# Patient Record
Sex: FEMALE | Race: WHITE | Employment: OTHER | URBAN - METROPOLITAN AREA
[De-identification: names, ages, dates, MRNs, and addresses within clinical notes are randomized per-mention and may not be internally consistent; named-entity substitution may affect disease eponyms.]

---

## 2020-09-11 ENCOUNTER — OFFICE VISIT (OUTPATIENT)
Dept: OBGYN CLINIC | Facility: CLINIC | Age: 25
End: 2020-09-11

## 2020-09-11 VITALS
SYSTOLIC BLOOD PRESSURE: 110 MMHG | DIASTOLIC BLOOD PRESSURE: 58 MMHG | WEIGHT: 138 LBS | HEART RATE: 99 BPM | BODY MASS INDEX: 25.4 KG/M2 | HEIGHT: 62 IN

## 2020-09-11 DIAGNOSIS — N76.0 VAGINITIS AND VULVOVAGINITIS: ICD-10-CM

## 2020-09-11 DIAGNOSIS — Z01.419 ENCOUNTER FOR WELL WOMAN EXAM WITH ROUTINE GYNECOLOGICAL EXAM: Primary | ICD-10-CM

## 2020-09-11 DIAGNOSIS — Z12.4 CERVICAL CANCER SCREENING: ICD-10-CM

## 2020-09-11 PROCEDURE — 88175 CYTOPATH C/V AUTO FLUID REDO: CPT | Performed by: OBSTETRICS & GYNECOLOGY

## 2020-09-11 PROCEDURE — 3074F SYST BP LT 130 MM HG: CPT | Performed by: OBSTETRICS & GYNECOLOGY

## 2020-09-11 PROCEDURE — 87591 N.GONORRHOEAE DNA AMP PROB: CPT | Performed by: OBSTETRICS & GYNECOLOGY

## 2020-09-11 PROCEDURE — 87660 TRICHOMONAS VAGIN DIR PROBE: CPT | Performed by: OBSTETRICS & GYNECOLOGY

## 2020-09-11 PROCEDURE — 87491 CHLMYD TRACH DNA AMP PROBE: CPT | Performed by: OBSTETRICS & GYNECOLOGY

## 2020-09-11 PROCEDURE — 3008F BODY MASS INDEX DOCD: CPT | Performed by: OBSTETRICS & GYNECOLOGY

## 2020-09-11 PROCEDURE — 87480 CANDIDA DNA DIR PROBE: CPT | Performed by: OBSTETRICS & GYNECOLOGY

## 2020-09-11 PROCEDURE — 87510 GARDNER VAG DNA DIR PROBE: CPT | Performed by: OBSTETRICS & GYNECOLOGY

## 2020-09-11 PROCEDURE — 99385 PREV VISIT NEW AGE 18-39: CPT | Performed by: OBSTETRICS & GYNECOLOGY

## 2020-09-11 PROCEDURE — 3078F DIAST BP <80 MM HG: CPT | Performed by: OBSTETRICS & GYNECOLOGY

## 2020-09-11 NOTE — PROGRESS NOTES
Andrew Dallas is a 30year old female  Patient's last menstrual period was 2020 (exact date). Patient presents with:  Gyn Problem: pt just wants order for blood work  Wellness Visit: ?   .Patient doesn't have complaints or concerns, would like meetings of clubs or organizations: Not on file        Relationship status: Not on file      Intimate partner violence:        Fear of current or ex partner: Not on file        Emotionally abused: Not on file        Physically abused: Not on file        Fo skin changes  Abdomen:  soft, nontender, nondistended, no masses  Skin/Hair: no unusual rashes or bruises  Extremities: no edema, no cyanosis  Psychiatric:  Oriented to time, place, person and situation.  Appropriate mood and affect    Pelvic Exam:  Externa

## 2020-09-14 LAB
C TRACH DNA SPEC QL NAA+PROBE: NEGATIVE
N GONORRHOEA DNA SPEC QL NAA+PROBE: NEGATIVE

## 2020-09-16 RX ORDER — METRONIDAZOLE 7.5 MG/G
1 GEL VAGINAL NIGHTLY
Qty: 70 G | Refills: 0 | Status: SHIPPED | OUTPATIENT
Start: 2020-09-16 | End: 2020-09-21

## 2020-09-17 NOTE — PROGRESS NOTES
Patient aware of positive culture results and recommendations for treatment. Script for Metrogel send to patient's pharmacy. Copy of results left for patient to  at the . Patient verbalizes understanding.

## 2020-11-10 ENCOUNTER — TELEPHONE (OUTPATIENT)
Dept: OBGYN CLINIC | Facility: CLINIC | Age: 25
End: 2020-11-10

## 2020-11-10 NOTE — TELEPHONE ENCOUNTER
Pt came to the office to  her envelope with her test results from September and also wanted the written prescription from dr TORRES from the day she was seen. Pt also had some questions about her bill.  We could not give her the prescription from [facility]

## 2022-02-17 ENCOUNTER — OFFICE VISIT (OUTPATIENT)
Dept: OBGYN CLINIC | Facility: CLINIC | Age: 27
End: 2022-02-17
Payer: COMMERCIAL

## 2022-02-17 ENCOUNTER — PATIENT MESSAGE (OUTPATIENT)
Dept: OBGYN CLINIC | Facility: CLINIC | Age: 27
End: 2022-02-17

## 2022-02-17 VITALS
SYSTOLIC BLOOD PRESSURE: 102 MMHG | HEIGHT: 62 IN | WEIGHT: 153 LBS | BODY MASS INDEX: 28.16 KG/M2 | HEART RATE: 105 BPM | DIASTOLIC BLOOD PRESSURE: 52 MMHG

## 2022-02-17 DIAGNOSIS — Z12.4 CERVICAL CANCER SCREENING: ICD-10-CM

## 2022-02-17 DIAGNOSIS — Z11.3 SCREEN FOR STD (SEXUALLY TRANSMITTED DISEASE): ICD-10-CM

## 2022-02-17 DIAGNOSIS — Z01.419 ENCOUNTER FOR WELL WOMAN EXAM WITH ROUTINE GYNECOLOGICAL EXAM: Primary | ICD-10-CM

## 2022-02-17 PROCEDURE — 87491 CHLMYD TRACH DNA AMP PROBE: CPT | Performed by: OBSTETRICS & GYNECOLOGY

## 2022-02-17 PROCEDURE — 88175 CYTOPATH C/V AUTO FLUID REDO: CPT | Performed by: OBSTETRICS & GYNECOLOGY

## 2022-02-17 PROCEDURE — 87591 N.GONORRHOEAE DNA AMP PROB: CPT | Performed by: OBSTETRICS & GYNECOLOGY

## 2022-02-17 PROCEDURE — 3078F DIAST BP <80 MM HG: CPT | Performed by: OBSTETRICS & GYNECOLOGY

## 2022-02-17 PROCEDURE — 3074F SYST BP LT 130 MM HG: CPT | Performed by: OBSTETRICS & GYNECOLOGY

## 2022-02-17 PROCEDURE — 99395 PREV VISIT EST AGE 18-39: CPT | Performed by: OBSTETRICS & GYNECOLOGY

## 2022-02-17 PROCEDURE — 3008F BODY MASS INDEX DOCD: CPT | Performed by: OBSTETRICS & GYNECOLOGY

## 2022-02-17 NOTE — TELEPHONE ENCOUNTER
Insurance card received through [address] [place] [address] [address] for check in purposes.

## 2022-02-18 LAB
C TRACH DNA SPEC QL NAA+PROBE: NEGATIVE
N GONORRHOEA DNA SPEC QL NAA+PROBE: NEGATIVE

## 2023-07-27 ENCOUNTER — TELEPHONE (OUTPATIENT)
Facility: CLINIC | Age: 28
End: 2023-07-27

## 2023-07-27 NOTE — TELEPHONE ENCOUNTER
Pt is returning our phone call - does not have ins on hand, will call back within 48 hrs with the info, understands we will Cx appt if no ins

## 2023-09-01 DIAGNOSIS — O36.80X0 PREGNANCY WITH UNCERTAIN FETAL VIABILITY, SINGLE OR UNSPECIFIED FETUS: Primary | ICD-10-CM

## 2023-09-07 ENCOUNTER — ULTRASOUND ENCOUNTER (OUTPATIENT)
Facility: CLINIC | Age: 28
End: 2023-09-07
Payer: COMMERCIAL

## 2023-09-11 ENCOUNTER — TELEPHONE (OUTPATIENT)
Dept: OBGYN CLINIC | Facility: CLINIC | Age: 28
End: 2023-09-11

## 2023-09-11 ENCOUNTER — INITIAL PRENATAL (OUTPATIENT)
Dept: OBGYN CLINIC | Facility: CLINIC | Age: 28
End: 2023-09-11
Payer: COMMERCIAL

## 2023-09-11 ENCOUNTER — LAB ENCOUNTER (OUTPATIENT)
Dept: LAB | Age: 28
End: 2023-09-11
Payer: COMMERCIAL

## 2023-09-11 VITALS
HEIGHT: 62 IN | WEIGHT: 153 LBS | DIASTOLIC BLOOD PRESSURE: 72 MMHG | BODY MASS INDEX: 28.16 KG/M2 | SYSTOLIC BLOOD PRESSURE: 106 MMHG | HEART RATE: 92 BPM

## 2023-09-11 DIAGNOSIS — Z34.01 ENCOUNTER FOR SUPERVISION OF NORMAL FIRST PREGNANCY IN FIRST TRIMESTER: Primary | ICD-10-CM

## 2023-09-11 DIAGNOSIS — Z34.91 INITIAL OBSTETRIC VISIT IN FIRST TRIMESTER: ICD-10-CM

## 2023-09-11 DIAGNOSIS — Z13.79 GENETIC SCREENING: ICD-10-CM

## 2023-09-11 DIAGNOSIS — O30.041 DICHORIONIC DIAMNIOTIC TWIN PREGNANCY IN FIRST TRIMESTER: ICD-10-CM

## 2023-09-11 LAB
ANTIBODY SCREEN: NEGATIVE
APPEARANCE: CLEAR
BASOPHILS # BLD AUTO: 0.02 X10(3) UL (ref 0–0.2)
BASOPHILS NFR BLD AUTO: 0.2 %
BILIRUB UR QL STRIP.AUTO: NEGATIVE
BILIRUBIN: NEGATIVE
CLARITY UR REFRACT.AUTO: CLEAR
COLOR UR AUTO: COLORLESS
EOSINOPHIL # BLD AUTO: 0.18 X10(3) UL (ref 0–0.7)
EOSINOPHIL NFR BLD AUTO: 2 %
ERYTHROCYTE [DISTWIDTH] IN BLOOD BY AUTOMATED COUNT: 12.7 %
GLUCOSE (URINE DIPSTICK): NEGATIVE MG/DL
GLUCOSE UR STRIP.AUTO-MCNC: NORMAL MG/DL
HBV SURFACE AG SER-ACNC: <0.1 [IU]/L
HBV SURFACE AG SERPL QL IA: NONREACTIVE
HCT VFR BLD AUTO: 37.5 %
HCV AB SERPL QL IA: NONREACTIVE
HGB BLD-MCNC: 11.9 G/DL
IMM GRANULOCYTES # BLD AUTO: 0.05 X10(3) UL (ref 0–1)
IMM GRANULOCYTES NFR BLD: 0.6 %
KETONES (URINE DIPSTICK): NEGATIVE MG/DL
KETONES UR STRIP.AUTO-MCNC: NEGATIVE MG/DL
LEUKOCYTE ESTERASE UR QL STRIP.AUTO: NEGATIVE
LEUKOCYTES: NEGATIVE
LYMPHOCYTES # BLD AUTO: 1.08 X10(3) UL (ref 1–4)
LYMPHOCYTES NFR BLD AUTO: 12.1 %
MCH RBC QN AUTO: 26.9 PG (ref 26–34)
MCHC RBC AUTO-ENTMCNC: 31.7 G/DL (ref 31–37)
MCV RBC AUTO: 84.7 FL
MONOCYTES # BLD AUTO: 0.47 X10(3) UL (ref 0.1–1)
MONOCYTES NFR BLD AUTO: 5.3 %
MULTISTIX LOT#: NORMAL NUMERIC
NEUTROPHILS # BLD AUTO: 7.1 X10 (3) UL (ref 1.5–7.7)
NEUTROPHILS # BLD AUTO: 7.1 X10(3) UL (ref 1.5–7.7)
NEUTROPHILS NFR BLD AUTO: 79.8 %
NITRITE UR QL STRIP.AUTO: NEGATIVE
NITRITE, URINE: NEGATIVE
OCCULT BLOOD: NEGATIVE
PH UR STRIP.AUTO: 6 [PH] (ref 5–8)
PH, URINE: 6 (ref 4.5–8)
PLATELET # BLD AUTO: 278 10(3)UL (ref 150–450)
PROT UR STRIP.AUTO-MCNC: NEGATIVE MG/DL
PROTEIN (URINE DIPSTICK): NEGATIVE MG/DL
RBC # BLD AUTO: 4.43 X10(6)UL
RBC UR QL AUTO: NEGATIVE
RH BLOOD TYPE: NEGATIVE
RUBV IGG SER QL: POSITIVE
RUBV IGG SER-ACNC: 356.8 IU/ML (ref 10–?)
SP GR UR STRIP.AUTO: <1.005 (ref 1–1.03)
SPECIFIC GRAVITY: 1 (ref 1–1.03)
T PALLIDUM AB SER QL IA: REACTIVE
URINE-COLOR: YELLOW
UROBILINOGEN UR STRIP.AUTO-MCNC: NORMAL MG/DL
UROBILINOGEN,SEMI-QN: 0.2 MG/DL (ref 0–1.9)
WBC # BLD AUTO: 8.9 X10(3) UL (ref 4–11)

## 2023-09-11 PROCEDURE — 86592 SYPHILIS TEST NON-TREP QUAL: CPT

## 2023-09-11 PROCEDURE — 87491 CHLMYD TRACH DNA AMP PROBE: CPT

## 2023-09-11 PROCEDURE — 86901 BLOOD TYPING SEROLOGIC RH(D): CPT

## 2023-09-11 PROCEDURE — 87086 URINE CULTURE/COLONY COUNT: CPT

## 2023-09-11 PROCEDURE — 85025 COMPLETE CBC W/AUTO DIFF WBC: CPT

## 2023-09-11 PROCEDURE — 86900 BLOOD TYPING SEROLOGIC ABO: CPT

## 2023-09-11 PROCEDURE — 86780 TREPONEMA PALLIDUM: CPT

## 2023-09-11 PROCEDURE — 87389 HIV-1 AG W/HIV-1&-2 AB AG IA: CPT

## 2023-09-11 PROCEDURE — 87591 N.GONORRHOEAE DNA AMP PROB: CPT

## 2023-09-11 PROCEDURE — 87340 HEPATITIS B SURFACE AG IA: CPT

## 2023-09-11 PROCEDURE — 81003 URINALYSIS AUTO W/O SCOPE: CPT

## 2023-09-11 PROCEDURE — 86803 HEPATITIS C AB TEST: CPT

## 2023-09-11 PROCEDURE — 86850 RBC ANTIBODY SCREEN: CPT

## 2023-09-11 PROCEDURE — 86762 RUBELLA ANTIBODY: CPT

## 2023-09-11 RX ORDER — CHOLECALCIFEROL (VITAMIN D3) 25 MCG
1 TABLET,CHEWABLE ORAL DAILY
COMMUNITY

## 2023-09-11 NOTE — PROGRESS NOTES
Initial OB 11w4d    She is doing well, no complaints. She is considering changing OB practices - \"not happy having a group of doctors. \"     Had OB US at the clinic , [facility] by LMP c/w 11w5d US - dichorinoic/diaminotic IUP .    Twin A measuring 11w5d, 159 bpm   Twin B measuring 11w3d, 160 bpm     OBHx:   PMHx: denies hepatitis, blood transfusion, HSV, VTE, Pap 2022 - NILM   PSHx: denies       Genetic Screening discussed  -NIPT & Carrier - drawn today       Di/Di twin pregnancy  -early consult MFM - ordered today  -L2US, growths, Nsts      Overweight  15-25 lb wt gain

## 2023-09-11 NOTE — TELEPHONE ENCOUNTER
Sandi Feldman patient's blood for NIPS and carrier screening. Patient confirmed the name and  on all three tubes were correct. Left box on counter.

## 2023-09-12 PROBLEM — O30.041 DICHORIONIC DIAMNIOTIC TWIN PREGNANCY IN FIRST TRIMESTER (HCC): Status: ACTIVE | Noted: 2023-09-12

## 2023-09-12 PROBLEM — O30.041 DICHORIONIC DIAMNIOTIC TWIN PREGNANCY IN FIRST TRIMESTER: Status: ACTIVE | Noted: 2023-09-12

## 2023-09-12 LAB
C TRACH DNA SPEC QL NAA+PROBE: NEGATIVE
N GONORRHOEA DNA SPEC QL NAA+PROBE: NEGATIVE
RPR SER QL: NONREACTIVE

## 2023-09-13 LAB — T PALLIDUM ABS: REACTIVE

## 2023-09-16 PROBLEM — Z67.91 RH NEGATIVE STATE IN ANTEPARTUM PERIOD (HCC): Status: ACTIVE | Noted: 2023-09-16

## 2023-09-16 PROBLEM — A53.9 SERUM POSITIVE FOR TREPONEMA PALLIDUM BY PCR: Status: ACTIVE | Noted: 2023-09-16

## 2023-09-16 PROBLEM — O26.899 RH NEGATIVE STATE IN ANTEPARTUM PERIOD (HCC): Status: ACTIVE | Noted: 2023-09-16

## 2023-09-16 PROBLEM — O26.899 RH NEGATIVE STATE IN ANTEPARTUM PERIOD: Status: ACTIVE | Noted: 2023-09-16

## 2023-09-16 PROBLEM — Z67.91 RH NEGATIVE STATE IN ANTEPARTUM PERIOD: Status: ACTIVE | Noted: 2023-09-16

## 2023-09-17 LAB
AMB EXT MYRIAD TRISOMY 13: NEGATIVE
AMB EXT MYRIAD TRISOMY 18: NEGATIVE
AMB EXT MYRIAD TRISOMY 21: NEGATIVE

## 2023-09-18 NOTE — PROGRESS NOTES
Discussed providers message: LAB results and recommendation Rhogam at 28wks , patient verbalized understanding.

## 2023-09-20 ENCOUNTER — TELEPHONE (OUTPATIENT)
Facility: CLINIC | Age: 28
End: 2023-09-20

## 2023-09-20 NOTE — TELEPHONE ENCOUNTER
NIPS negative, pt declined release of her results in [facility] request for results to be placed in an envelope, pt can  at . Patient verbalized understanding, agreed to and intend to comply with plan of care.

## 2023-09-25 ENCOUNTER — TELEPHONE (OUTPATIENT)
Facility: CLINIC | Age: 28
End: 2023-09-25

## 2023-09-25 NOTE — TELEPHONE ENCOUNTER
Discussed Carrier result: [facility] partner screening, results released, [facility] genetic counseling-contact given. If pt would like to proceed with partner testing, to call our office. We'll need partner's name . Per pt her address is not correct in her chart, transferred to [facility] to update info. Patient verbalized understanding, agreed to and intend to comply with plan of care.

## 2023-09-25 NOTE — TELEPHONE ENCOUNTER
Called pt VM not set up, does not have mychart msg sanjana.      Need to let pt know:  Carrier result: Marylin Henderson  [phone]

## 2023-10-11 ENCOUNTER — ROUTINE PRENATAL (OUTPATIENT)
Facility: CLINIC | Age: 28
End: 2023-10-11
Payer: COMMERCIAL

## 2023-10-11 VITALS
BODY MASS INDEX: 28.16 KG/M2 | HEART RATE: 114 BPM | SYSTOLIC BLOOD PRESSURE: 104 MMHG | WEIGHT: 153 LBS | HEIGHT: 62 IN | DIASTOLIC BLOOD PRESSURE: 61 MMHG

## 2023-10-11 DIAGNOSIS — Z34.02 ENCOUNTER FOR SUPERVISION OF NORMAL FIRST PREGNANCY IN SECOND TRIMESTER: Primary | ICD-10-CM

## 2023-10-11 DIAGNOSIS — O30.041 DICHORIONIC DIAMNIOTIC TWIN PREGNANCY IN FIRST TRIMESTER: ICD-10-CM

## 2023-10-11 DIAGNOSIS — Z3A.15 15 WEEKS GESTATION OF PREGNANCY: ICD-10-CM

## 2023-10-11 PROCEDURE — 3008F BODY MASS INDEX DOCD: CPT | Performed by: OBSTETRICS & GYNECOLOGY

## 2023-10-11 PROCEDURE — 3078F DIAST BP <80 MM HG: CPT | Performed by: OBSTETRICS & GYNECOLOGY

## 2023-10-11 PROCEDURE — 3074F SYST BP LT 130 MM HG: CPT | Performed by: OBSTETRICS & GYNECOLOGY

## 2023-10-14 NOTE — PROGRESS NOTES
Patient has no complaints, multiple questions answered   - EDC by LMP c/w 11w5d US    Genetic Screening discussed  -NIPT neg  - Azalea Dumas 39. - partner testing consider       1. Di/Di twin pregnancy  -L2U MFM - ordered today  -US growths, NSTs per MFM      2. Overweight  15-25 lb wt gain

## 2023-11-09 ENCOUNTER — TELEPHONE (OUTPATIENT)
Dept: PERINATAL CARE | Facility: HOSPITAL | Age: 28
End: 2023-11-09

## 2023-11-15 ENCOUNTER — ULTRASOUND ENCOUNTER (OUTPATIENT)
Dept: PERINATAL CARE | Facility: HOSPITAL | Age: 28
End: 2023-11-15
Attending: OBSTETRICS & GYNECOLOGY
Payer: COMMERCIAL

## 2023-11-15 VITALS
HEIGHT: 62 IN | BODY MASS INDEX: 29.81 KG/M2 | WEIGHT: 162 LBS | DIASTOLIC BLOOD PRESSURE: 63 MMHG | HEART RATE: 116 BPM | SYSTOLIC BLOOD PRESSURE: 109 MMHG

## 2023-11-15 DIAGNOSIS — O30.042 DICHORIONIC DIAMNIOTIC TWIN PREGNANCY IN SECOND TRIMESTER: Primary | ICD-10-CM

## 2023-11-15 DIAGNOSIS — O30.041 DICHORIONIC DIAMNIOTIC TWIN PREGNANCY IN FIRST TRIMESTER: ICD-10-CM

## 2023-11-15 PROCEDURE — 76811 OB US DETAILED SNGL FETUS: CPT | Performed by: OBSTETRICS & GYNECOLOGY

## 2023-11-15 PROCEDURE — 76811 OB US DETAILED SNGL FETUS: CPT

## 2023-11-15 PROCEDURE — 76812 OB US DETAILED ADDL FETUS: CPT

## 2023-11-17 ENCOUNTER — ROUTINE PRENATAL (OUTPATIENT)
Dept: OBGYN CLINIC | Facility: CLINIC | Age: 28
End: 2023-11-17
Payer: COMMERCIAL

## 2023-11-17 VITALS
HEIGHT: 62 IN | BODY MASS INDEX: 29.62 KG/M2 | DIASTOLIC BLOOD PRESSURE: 78 MMHG | HEART RATE: 120 BPM | SYSTOLIC BLOOD PRESSURE: 116 MMHG | WEIGHT: 160.94 LBS

## 2023-11-17 DIAGNOSIS — O30.042 DICHORIONIC DIAMNIOTIC TWIN PREGNANCY IN SECOND TRIMESTER: Primary | ICD-10-CM

## 2023-11-17 PROCEDURE — 3074F SYST BP LT 130 MM HG: CPT

## 2023-11-17 PROCEDURE — 3078F DIAST BP <80 MM HG: CPT

## 2023-11-17 PROCEDURE — 3008F BODY MASS INDEX DOCD: CPT

## 2023-11-17 NOTE — PROGRESS NOTES
SLIME 21w1d    She is doing well, no complaints. Doppler Twin A 156 ( maternal left)                Twin B 144 ( maternal right)    EDC by LMP c/w 11w5d US     Genetic Screening discussed  -NIPT neg - does not want to know the gender   - Azalea Dumas 39. - partner testing consider         1. Di/Di twin pregnancy  -L2U MFM - normal anatomy for twin A & B, 8% discordance   - Monthly US growths & BPP at 32 weeks   -Weekly NSTs at 32 weeks  -Delivery at 38-38w6d  -consider low dose ASA  mg daily til 37 weeks         2. Overweight  15-25 lb wt gain

## 2023-12-11 DIAGNOSIS — A53.9 SERUM POSITIVE FOR TREPONEMA PALLIDUM BY PCR: Primary | ICD-10-CM

## 2023-12-13 ENCOUNTER — TELEPHONE (OUTPATIENT)
Dept: OBGYN CLINIC | Facility: CLINIC | Age: 28
End: 2023-12-13

## 2023-12-13 NOTE — TELEPHONE ENCOUNTER
Received a call from the health department regarding test results. On 9/11/23 T pallidum was reactive but RPR was non reactive. Health dept requires a third test to be done to confirm pt is negative for syphilis. Pt aware Juanjo Lyons placed the order for a follow up blood test. Pt will discuss with Dr. Herve Darby at appointment tomorrow and have blood test done. Verbalized understanding.

## 2023-12-14 ENCOUNTER — LAB ENCOUNTER (OUTPATIENT)
Dept: LAB | Age: 28
End: 2023-12-14
Attending: OBSTETRICS & GYNECOLOGY
Payer: COMMERCIAL

## 2023-12-14 ENCOUNTER — ROUTINE PRENATAL (OUTPATIENT)
Facility: CLINIC | Age: 28
End: 2023-12-14
Payer: COMMERCIAL

## 2023-12-14 VITALS
DIASTOLIC BLOOD PRESSURE: 60 MMHG | WEIGHT: 165.5 LBS | HEART RATE: 106 BPM | SYSTOLIC BLOOD PRESSURE: 116 MMHG | HEIGHT: 62 IN | BODY MASS INDEX: 30.46 KG/M2

## 2023-12-14 DIAGNOSIS — N89.8 VAGINA ITCHING: ICD-10-CM

## 2023-12-14 DIAGNOSIS — R35.0 URINARY FREQUENCY: ICD-10-CM

## 2023-12-14 DIAGNOSIS — Z34.02 ENCOUNTER FOR SUPERVISION OF NORMAL FIRST PREGNANCY IN SECOND TRIMESTER: Primary | ICD-10-CM

## 2023-12-14 DIAGNOSIS — Z3A.25 25 WEEKS GESTATION OF PREGNANCY: ICD-10-CM

## 2023-12-14 DIAGNOSIS — Z34.02 ENCOUNTER FOR SUPERVISION OF NORMAL FIRST PREGNANCY IN SECOND TRIMESTER: ICD-10-CM

## 2023-12-14 DIAGNOSIS — A53.9 SERUM POSITIVE FOR TREPONEMA PALLIDUM BY PCR: ICD-10-CM

## 2023-12-14 LAB
BASOPHILS # BLD AUTO: 0.02 X10(3) UL (ref 0–0.2)
BASOPHILS NFR BLD AUTO: 0.2 %
EOSINOPHIL # BLD AUTO: 0.15 X10(3) UL (ref 0–0.7)
EOSINOPHIL NFR BLD AUTO: 1.7 %
ERYTHROCYTE [DISTWIDTH] IN BLOOD BY AUTOMATED COUNT: 14.2 %
GLUCOSE 1H P GLC SERPL-MCNC: 193 MG/DL
HCT VFR BLD AUTO: 30 %
HGB BLD-MCNC: 10 G/DL
IMM GRANULOCYTES # BLD AUTO: 0.11 X10(3) UL (ref 0–1)
IMM GRANULOCYTES NFR BLD: 1.2 %
LYMPHOCYTES # BLD AUTO: 1.2 X10(3) UL (ref 1–4)
LYMPHOCYTES NFR BLD AUTO: 13.5 %
MCH RBC QN AUTO: 27.5 PG (ref 26–34)
MCHC RBC AUTO-ENTMCNC: 33.3 G/DL (ref 31–37)
MCV RBC AUTO: 82.6 FL
MONOCYTES # BLD AUTO: 0.43 X10(3) UL (ref 0.1–1)
MONOCYTES NFR BLD AUTO: 4.8 %
NEUTROPHILS # BLD AUTO: 6.96 X10 (3) UL (ref 1.5–7.7)
NEUTROPHILS # BLD AUTO: 6.96 X10(3) UL (ref 1.5–7.7)
NEUTROPHILS NFR BLD AUTO: 78.6 %
PLATELET # BLD AUTO: 236 10(3)UL (ref 150–450)
RBC # BLD AUTO: 3.63 X10(6)UL
WBC # BLD AUTO: 8.9 X10(3) UL (ref 4–11)

## 2023-12-14 PROCEDURE — 82950 GLUCOSE TEST: CPT

## 2023-12-14 PROCEDURE — 86780 TREPONEMA PALLIDUM: CPT

## 2023-12-14 PROCEDURE — 81514 NFCT DS BV&VAGINITIS DNA ALG: CPT | Performed by: OBSTETRICS & GYNECOLOGY

## 2023-12-14 PROCEDURE — 36415 COLL VENOUS BLD VENIPUNCTURE: CPT

## 2023-12-14 PROCEDURE — 87086 URINE CULTURE/COLONY COUNT: CPT | Performed by: OBSTETRICS & GYNECOLOGY

## 2023-12-14 PROCEDURE — 85025 COMPLETE CBC W/AUTO DIFF WBC: CPT

## 2023-12-15 DIAGNOSIS — Z34.92 PRENATAL CARE IN SECOND TRIMESTER: ICD-10-CM

## 2023-12-15 DIAGNOSIS — O99.019 ANTEPARTUM ANEMIA: Primary | ICD-10-CM

## 2023-12-15 DIAGNOSIS — R73.02 IMPAIRED GLUCOSE TOLERANCE: Primary | ICD-10-CM

## 2023-12-15 LAB
BV BACTERIA DNA VAG QL NAA+PROBE: NEGATIVE
C GLABRATA DNA VAG QL NAA+PROBE: NEGATIVE
C KRUSEI DNA VAG QL NAA+PROBE: NEGATIVE
CANDIDA DNA VAG QL NAA+PROBE: POSITIVE
FTA AB T PALLIDUM: REACTIVE
T VAGINALIS DNA VAG QL NAA+PROBE: NEGATIVE

## 2023-12-18 ENCOUNTER — TELEPHONE (OUTPATIENT)
Facility: CLINIC | Age: 28
End: 2023-12-18

## 2023-12-18 NOTE — TELEPHONE ENCOUNTER
Pt having trouble receiving her results from [facility], contact information provided. Patient verbalized understanding.

## 2023-12-18 NOTE — TELEPHONE ENCOUNTER
Please print invitae results. Pt did not understand the gender on the previous results she picked up. Let  know when ready.

## 2023-12-18 NOTE — TELEPHONE ENCOUNTER
Pt upset on phone, she thinks she received wrong envelope instead of gender. Discussed what kind of information she received, pt dismissive saying it does not matter, it was all the results! And insist that her friend will call our office for us to provide gender info. Advised that we can release gender result in her email and she can fwd email to her friend,ediht. Understanding.

## 2023-12-20 NOTE — PROGRESS NOTES
Pt aware of results & recommendations for Terazole & IV iron. Terazole order pended. Will check if PA is needed and give pt scheduling information at her appointment on Friday. Verbalized understanding.

## 2023-12-21 ENCOUNTER — TELEPHONE (OUTPATIENT)
Dept: OBGYN CLINIC | Facility: CLINIC | Age: 28
End: 2023-12-21

## 2023-12-21 PROBLEM — O99.012 ANEMIA COMPLICATING PREGNANCY IN SECOND TRIMESTER: Status: ACTIVE | Noted: 2023-12-21

## 2023-12-21 PROBLEM — O99.012 ANEMIA COMPLICATING PREGNANCY IN SECOND TRIMESTER (HCC): Status: ACTIVE | Noted: 2023-12-21

## 2023-12-21 NOTE — TELEPHONE ENCOUNTER
[employer] [place] called to check if PA is required for IV iorn infusions. No prior authorization requires for CPT codes [phone]. Ref #[id number]. Will fax order to the [facility] ([facility]) once signed. Pt requests to get scheduling information at her [address] [id number] appointment tomorrow.

## 2023-12-22 ENCOUNTER — TELEPHONE (OUTPATIENT)
Dept: HEMATOLOGY/ONCOLOGY | Facility: HOSPITAL | Age: 28
End: 2023-12-22

## 2023-12-22 ENCOUNTER — ROUTINE PRENATAL (OUTPATIENT)
Facility: CLINIC | Age: 28
End: 2023-12-22
Payer: COMMERCIAL

## 2023-12-22 ENCOUNTER — LAB ENCOUNTER (OUTPATIENT)
Dept: LAB | Age: 28
End: 2023-12-22
Attending: OBSTETRICS & GYNECOLOGY
Payer: COMMERCIAL

## 2023-12-22 VITALS
WEIGHT: 164 LBS | BODY MASS INDEX: 30.18 KG/M2 | SYSTOLIC BLOOD PRESSURE: 100 MMHG | DIASTOLIC BLOOD PRESSURE: 60 MMHG | HEIGHT: 62 IN | HEART RATE: 103 BPM

## 2023-12-22 DIAGNOSIS — Z34.92 PRENATAL CARE IN SECOND TRIMESTER: ICD-10-CM

## 2023-12-22 DIAGNOSIS — O99.019 ANTEPARTUM ANEMIA: ICD-10-CM

## 2023-12-22 DIAGNOSIS — R73.02 IMPAIRED GLUCOSE TOLERANCE: ICD-10-CM

## 2023-12-22 DIAGNOSIS — O24.410 DIET CONTROLLED GESTATIONAL DIABETES MELLITUS (GDM) IN SECOND TRIMESTER: Primary | ICD-10-CM

## 2023-12-22 LAB
DEPRECATED HBV CORE AB SER IA-ACNC: 19 NG/ML
GLUCOSE 1H P GLC SERPL-MCNC: 204 MG/DL
GLUCOSE 2H P GLC SERPL-MCNC: 205 MG/DL
GLUCOSE 3H P GLC SERPL-MCNC: 190 MG/DL (ref 70–140)
GLUCOSE P FAST SERPL-MCNC: 98 MG/DL

## 2023-12-22 PROCEDURE — 82952 GTT-ADDED SAMPLES: CPT

## 2023-12-22 PROCEDURE — 36415 COLL VENOUS BLD VENIPUNCTURE: CPT

## 2023-12-22 PROCEDURE — 82951 GLUCOSE TOLERANCE TEST (GTT): CPT

## 2023-12-22 PROCEDURE — 82728 ASSAY OF FERRITIN: CPT

## 2023-12-22 NOTE — PROGRESS NOTES
Patient is very upset we tested her for syphilis again and keep calling her to follow up - she admits to h/o syphilis and treatment - she does not want her spouse to know about and for us to bring it up again   - Patient is doing her 3 GTT today and already has 2 elevated values, discussed GDM, diabetes education appointment scheduled    -NIPT neg - one twin is a [address] - partner testing consider         1. Di/Di twin pregnancy  -L2U MFM - normal anatomy for twin A & B, 8% discordance , has follow up scheduled  - Monthly US growths & BPP at 32 weeks   -Weekly NSTs at 32 weeks  -Delivery at 38-38w6d  -consider low dose ASA  mg daily til 37 weeks         2. Overweight  15-25 lb wt gain     3.  GDM  - patient has machine to check her sugars, got it from [employer] awhile ago   - will schedule her appointment with diabetes educator and bring log next visit

## 2023-12-22 NOTE — PROGRESS NOTES
Patient has no complaints  - CBC, 1GTT ordered  - patient aware Tpal positive, RPR neg, repeat ordered      Genetic Screening discussed  -NIPT neg - one twin is a [address] - partner testing consider         1. Di/Di twin pregnancy  -L2U MFM - normal anatomy for twin A & B, 8% discordance   - Monthly US growths & BPP at 32 weeks   -Weekly NSTs at 32 weeks  -Delivery at 38-38w6d  -consider low dose ASA  mg daily til 37 weeks         2. Overweight  15-25 lb wt gain

## 2023-12-26 ENCOUNTER — TELEPHONE (OUTPATIENT)
Facility: CLINIC | Age: 28
End: 2023-12-26

## 2023-12-26 ENCOUNTER — OFFICE VISIT (OUTPATIENT)
Dept: PERINATAL CARE | Facility: HOSPITAL | Age: 28
End: 2023-12-26
Attending: OBSTETRICS & GYNECOLOGY
Payer: COMMERCIAL

## 2023-12-26 VITALS
HEIGHT: 62 IN | WEIGHT: 165 LBS | HEART RATE: 111 BPM | SYSTOLIC BLOOD PRESSURE: 108 MMHG | DIASTOLIC BLOOD PRESSURE: 66 MMHG | BODY MASS INDEX: 30.36 KG/M2

## 2023-12-26 DIAGNOSIS — O30.041 DICHORIONIC DIAMNIOTIC TWIN PREGNANCY IN FIRST TRIMESTER: ICD-10-CM

## 2023-12-26 DIAGNOSIS — O24.410 DIET CONTROLLED GESTATIONAL DIABETES MELLITUS (GDM) IN THIRD TRIMESTER: ICD-10-CM

## 2023-12-26 DIAGNOSIS — O30.041 DICHORIONIC DIAMNIOTIC TWIN PREGNANCY IN FIRST TRIMESTER: Primary | ICD-10-CM

## 2023-12-26 PROCEDURE — 76816 OB US FOLLOW-UP PER FETUS: CPT | Performed by: OBSTETRICS & GYNECOLOGY

## 2023-12-26 NOTE — TELEPHONE ENCOUNTER
Pt came into the office to state we sent her prescription to the wrong [facility]. Updated [facility] on file to the correct one (close to her house)   Advised pt to call the [facility] and have the script transferred over. Pt states to still send a message to the nurses please.

## 2023-12-26 NOTE — PROGRESS NOTES
Pt here for Growth Ultrasound  +fm noted both babies per patient  Pt denies complaints.    Pt noted GDM--Pt to meet with Diabetic Educator--OB following

## 2023-12-29 NOTE — TELEPHONE ENCOUNTER
Pt did not transfer her Rx to the pharmacy near home. Please send new Rx to the updated [facility] on file.

## 2024-01-03 ENCOUNTER — ROUTINE PRENATAL (OUTPATIENT)
Facility: CLINIC | Age: 29
End: 2024-01-03
Payer: MEDICAID

## 2024-01-03 ENCOUNTER — OFFICE VISIT (OUTPATIENT)
Dept: HEMATOLOGY/ONCOLOGY | Facility: HOSPITAL | Age: 29
End: 2024-01-03
Attending: OBSTETRICS & GYNECOLOGY
Payer: COMMERCIAL

## 2024-01-03 ENCOUNTER — LAB ENCOUNTER (OUTPATIENT)
Dept: LAB | Age: 29
End: 2024-01-03
Attending: OBSTETRICS & GYNECOLOGY
Payer: COMMERCIAL

## 2024-01-03 VITALS
SYSTOLIC BLOOD PRESSURE: 102 MMHG | HEART RATE: 108 BPM | HEIGHT: 62 IN | WEIGHT: 170 LBS | BODY MASS INDEX: 31.28 KG/M2 | DIASTOLIC BLOOD PRESSURE: 62 MMHG

## 2024-01-03 VITALS
DIASTOLIC BLOOD PRESSURE: 76 MMHG | HEART RATE: 94 BPM | TEMPERATURE: 98 F | RESPIRATION RATE: 16 BRPM | WEIGHT: 170.19 LBS | BODY MASS INDEX: 30.92 KG/M2 | HEIGHT: 62.01 IN | SYSTOLIC BLOOD PRESSURE: 118 MMHG | OXYGEN SATURATION: 100 %

## 2024-01-03 DIAGNOSIS — O24.410 DIET CONTROLLED GESTATIONAL DIABETES MELLITUS (GDM) IN SECOND TRIMESTER: ICD-10-CM

## 2024-01-03 DIAGNOSIS — D50.9 MATERNAL IRON DEFICIENCY ANEMIA AFFECTING PREGNANCY IN SECOND TRIMESTER, ANTEPARTUM: ICD-10-CM

## 2024-01-03 DIAGNOSIS — Z11.4 SCREENING FOR HIV (HUMAN IMMUNODEFICIENCY VIRUS): ICD-10-CM

## 2024-01-03 DIAGNOSIS — O09.892 HISTORY OF MATERNAL SYPHILIS, CURRENTLY PREGNANT IN SECOND TRIMESTER: ICD-10-CM

## 2024-01-03 DIAGNOSIS — D50.9 MATERNAL IRON DEFICIENCY ANEMIA AFFECTING PREGNANCY IN SECOND TRIMESTER, ANTEPARTUM: Primary | ICD-10-CM

## 2024-01-03 DIAGNOSIS — O99.012 MATERNAL IRON DEFICIENCY ANEMIA AFFECTING PREGNANCY IN SECOND TRIMESTER, ANTEPARTUM: ICD-10-CM

## 2024-01-03 DIAGNOSIS — Z28.21 TETANUS, DIPHTHERIA, AND ACELLULAR PERTUSSIS (TDAP) VACCINATION DECLINED: ICD-10-CM

## 2024-01-03 DIAGNOSIS — Z14.8 GENETIC CARRIER: ICD-10-CM

## 2024-01-03 DIAGNOSIS — Z67.91 RH NEGATIVE STATUS DURING PREGNANCY IN SECOND TRIMESTER: ICD-10-CM

## 2024-01-03 DIAGNOSIS — O26.892 RH NEGATIVE STATUS DURING PREGNANCY IN SECOND TRIMESTER: ICD-10-CM

## 2024-01-03 DIAGNOSIS — B37.31 VAGINAL YEAST INFECTION: ICD-10-CM

## 2024-01-03 DIAGNOSIS — D56.3 ALPHA THALASSEMIA TRAIT: ICD-10-CM

## 2024-01-03 DIAGNOSIS — O99.012 MATERNAL IRON DEFICIENCY ANEMIA AFFECTING PREGNANCY IN SECOND TRIMESTER, ANTEPARTUM: Primary | ICD-10-CM

## 2024-01-03 DIAGNOSIS — Z60.3 LANGUAGE BARRIER: ICD-10-CM

## 2024-01-03 DIAGNOSIS — E66.3 OVERWEIGHT (BMI 25.0-29.9): ICD-10-CM

## 2024-01-03 DIAGNOSIS — Z86.19 HISTORY OF SYPHILIS: ICD-10-CM

## 2024-01-03 DIAGNOSIS — O30.042 DICHORIONIC DIAMNIOTIC TWIN PREGNANCY IN SECOND TRIMESTER: Primary | ICD-10-CM

## 2024-01-03 DIAGNOSIS — Z75.8 LANGUAGE BARRIER: ICD-10-CM

## 2024-01-03 PROBLEM — A53.9 SERUM POSITIVE FOR TREPONEMA PALLIDUM BY PCR: Status: ACTIVE | Noted: 2024-01-03

## 2024-01-03 LAB
EST. AVERAGE GLUCOSE BLD GHB EST-MCNC: 105 MG/DL (ref 68–126)
HBA1C MFR BLD: 5.3 % (ref ?–5.7)

## 2024-01-03 PROCEDURE — 86592 SYPHILIS TEST NON-TREP QUAL: CPT

## 2024-01-03 PROCEDURE — 99214 OFFICE O/P EST MOD 30 MIN: CPT | Performed by: OBSTETRICS & GYNECOLOGY

## 2024-01-03 PROCEDURE — 96374 THER/PROPH/DIAG INJ IV PUSH: CPT

## 2024-01-03 PROCEDURE — 36415 COLL VENOUS BLD VENIPUNCTURE: CPT

## 2024-01-03 PROCEDURE — 83036 HEMOGLOBIN GLYCOSYLATED A1C: CPT

## 2024-01-03 PROCEDURE — 3078F DIAST BP <80 MM HG: CPT | Performed by: OBSTETRICS & GYNECOLOGY

## 2024-01-03 PROCEDURE — 96372 THER/PROPH/DIAG INJ SC/IM: CPT | Performed by: OBSTETRICS & GYNECOLOGY

## 2024-01-03 PROCEDURE — 3074F SYST BP LT 130 MM HG: CPT | Performed by: OBSTETRICS & GYNECOLOGY

## 2024-01-03 PROCEDURE — 3008F BODY MASS INDEX DOCD: CPT | Performed by: OBSTETRICS & GYNECOLOGY

## 2024-01-03 PROCEDURE — 87389 HIV-1 AG W/HIV-1&-2 AB AG IA: CPT

## 2024-01-03 SDOH — SOCIAL STABILITY - SOCIAL INSECURITY: ACCULTURATION DIFFICULTY: Z60.3
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
## 2024-01-03 NOTE — PATIENT INSTRUCTIONS
Diabetic education  [phone]     Aspirin in pregnancy  -81 mg tablet - take 1.5 or 2 tablets per day. Start at 12-13 weeks   -may help prevent  pre-eclampsia by helping with placental development and function  -may discontinue at term (37 wk)     Tdap (Tetanus, Diptheria, Pertussis)   -booster shot for pertussis (whooping cough)  -recommended by the CDC (Centers for Disease Control) for every pregnant woman in the third trimester (28 weeks and beyond)  -the purpose of giving the vaccine during pregnancy is so that the mother can share her antibodies with the fetus across the placenta  -it is recommended to take this vaccine early in the third trimester so that your body has enough time to produce the antibodies that can pass across the placenta to the fetus  -the infant cannot be vaccinated for pertussis until 2 months of age due to an immature immune system and lack of response to the vaccine prior to that time.   -the father of the baby as well as grandparents, other caregivers, etc should receive a booster shot for whooping cough as well (vaccination at least 1 time after the age of 18 is sufficient)     [facility] Prenatal Classes  [email or web address]  [phone]    Pediatrics/Family Medicine (Doctor for baby)    Pediatrics - birth through 18 years of age  Family Medicine/Practice - birth through adulthood    Please select a pediatrician or family medicine provider BEFORE you are admitted to the hospital to give birth.     Make sure that provider accepts your insurance.   Pick a provider that is close to where you live.    If the provider is on staff at [place], the nursing staff will notify them when your infant is born so they are aware to come to the hospital to examine the infant.     If the provider you have chosen is not on staff at [place], a pediatric hospitalist will care for your infant during the hospitalization only. You must arrange the follow up visit with  your provider.     After delivery at the hospital follow up visit instructions for baby will be provided prior to discharge.     The baby will not be able to leave the hospital without a follow up visit scheduled.     To establish care:  [email or web address]  Or   [facility] Physician Referral line at [phone]      There are MANY providers available. It would be impossible to list them all, but here are a few popular pediatrics groups in [place]:    [facility] [place] [phone]  [facility] [place] [phone]  [facility] [place] [phone]  [facility] [place] [phone]  [facility] [place] [phone]  [facility] [phone]    There are also many wonderful independent practitioners as well. We recommend you speak with family, friends, colleagues as personal recommendations can be very helpful.

## 2024-01-03 NOTE — PROGRESS NOTES
Education Record    Learner:  Patient    Disease / Diagnosis:    Barriers / Limitations:  None    Method:  Brief focused, printed material and  reinforcement    General Topics:  Plan of care reviewed    Outcome:  Shows understanding. Pt tolerated venofer injection. Left in stable condition. Appts in place for next visit.

## 2024-01-04 LAB — RPR SER QL: NONREACTIVE

## 2024-01-05 ENCOUNTER — OFFICE VISIT (OUTPATIENT)
Dept: HEMATOLOGY/ONCOLOGY | Facility: HOSPITAL | Age: 29
End: 2024-01-05
Attending: OBSTETRICS & GYNECOLOGY
Payer: COMMERCIAL

## 2024-01-05 VITALS
BODY MASS INDEX: 30 KG/M2 | SYSTOLIC BLOOD PRESSURE: 121 MMHG | HEART RATE: 100 BPM | DIASTOLIC BLOOD PRESSURE: 72 MMHG | OXYGEN SATURATION: 100 % | TEMPERATURE: 98 F | RESPIRATION RATE: 18 BRPM | WEIGHT: 165.81 LBS

## 2024-01-05 DIAGNOSIS — O99.012 MATERNAL IRON DEFICIENCY ANEMIA AFFECTING PREGNANCY IN SECOND TRIMESTER, ANTEPARTUM: Primary | ICD-10-CM

## 2024-01-05 DIAGNOSIS — D50.9 MATERNAL IRON DEFICIENCY ANEMIA AFFECTING PREGNANCY IN SECOND TRIMESTER, ANTEPARTUM: Primary | ICD-10-CM

## 2024-01-05 LAB — SYPHILIS AB BY TP-PA, SERUM: POSITIVE

## 2024-01-05 PROCEDURE — 96374 THER/PROPH/DIAG INJ IV PUSH: CPT

## 2024-01-05 NOTE — PROGRESS NOTES
Pt here for Venofer . Pt denies any issues or concerns.      Ordering MD: dr wolfe  Order Exp: 2-1-24     Pt tolerated infusion without difficulty or complaint. Reviewed next apt date/time: yes      Education Record  Learner:  Patient and Spouse  Disease / Diagnosis: CHRIS  Barriers / Limitations:  None  Method:  Brief focused  General Topics:  Plan of care reviewed  Outcome:  Shows understanding

## 2024-01-08 ENCOUNTER — TELEPHONE (OUTPATIENT)
Dept: ENDOCRINOLOGY CLINIC | Facility: CLINIC | Age: 29
End: 2024-01-08

## 2024-01-08 ENCOUNTER — OFFICE VISIT (OUTPATIENT)
Dept: HEMATOLOGY/ONCOLOGY | Facility: HOSPITAL | Age: 29
End: 2024-01-08
Attending: OBSTETRICS & GYNECOLOGY
Payer: COMMERCIAL

## 2024-01-08 VITALS
HEART RATE: 105 BPM | RESPIRATION RATE: 18 BRPM | TEMPERATURE: 98 F | DIASTOLIC BLOOD PRESSURE: 74 MMHG | SYSTOLIC BLOOD PRESSURE: 121 MMHG | OXYGEN SATURATION: 99 %

## 2024-01-08 DIAGNOSIS — D50.9 MATERNAL IRON DEFICIENCY ANEMIA AFFECTING PREGNANCY IN SECOND TRIMESTER, ANTEPARTUM: Primary | ICD-10-CM

## 2024-01-08 DIAGNOSIS — O99.012 MATERNAL IRON DEFICIENCY ANEMIA AFFECTING PREGNANCY IN SECOND TRIMESTER, ANTEPARTUM: Primary | ICD-10-CM

## 2024-01-08 PROCEDURE — 96374 THER/PROPH/DIAG INJ IV PUSH: CPT

## 2024-01-08 NOTE — PROGRESS NOTES
Education Record    Learner:  Patient    Disease / Diagnosis: Here for venofer.     Barriers / Limitations:  None    Method:  Brief focused, printed material and  reinforcement    General Topics:  Plan of care reviewed    Outcome:  Shows understanding. Pt tolerated venofer injection. Left in stable condition. VSS.

## 2024-01-10 ENCOUNTER — OFFICE VISIT (OUTPATIENT)
Dept: HEMATOLOGY/ONCOLOGY | Facility: HOSPITAL | Age: 29
End: 2024-01-10
Attending: OBSTETRICS & GYNECOLOGY
Payer: COMMERCIAL

## 2024-01-10 VITALS
HEART RATE: 109 BPM | SYSTOLIC BLOOD PRESSURE: 110 MMHG | RESPIRATION RATE: 20 BRPM | TEMPERATURE: 99 F | OXYGEN SATURATION: 96 % | DIASTOLIC BLOOD PRESSURE: 67 MMHG

## 2024-01-10 DIAGNOSIS — D50.9 MATERNAL IRON DEFICIENCY ANEMIA AFFECTING PREGNANCY IN SECOND TRIMESTER, ANTEPARTUM: Primary | ICD-10-CM

## 2024-01-10 DIAGNOSIS — O99.012 MATERNAL IRON DEFICIENCY ANEMIA AFFECTING PREGNANCY IN SECOND TRIMESTER, ANTEPARTUM: Primary | ICD-10-CM

## 2024-01-10 PROCEDURE — 96374 THER/PROPH/DIAG INJ IV PUSH: CPT

## 2024-01-10 NOTE — PROGRESS NOTES
Pt here for venofer . Pt denies any issues or concerns.      Ordering MD: Dr. Doyel  Order Exp: after Friday's dose     Pt tolerated infusion without difficulty or complaint. Reviewed next apt date/time: 1/12 at 1pm      Education Record  Learner:  Patient  Disease / Diagnosis: anemia  Barriers / Limitations:  None  Method:  Discussion and Reinforcement  General Topics:  Medication and Plan of care reviewed  Outcome:  Shows understanding

## 2024-01-11 ENCOUNTER — TELEPHONE (OUTPATIENT)
Dept: OBGYN CLINIC | Facility: CLINIC | Age: 29
End: 2024-01-11
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
## 2024-01-12 ENCOUNTER — OFFICE VISIT (OUTPATIENT)
Dept: HEMATOLOGY/ONCOLOGY | Facility: HOSPITAL | Age: 29
End: 2024-01-12
Attending: OBSTETRICS & GYNECOLOGY
Payer: COMMERCIAL

## 2024-01-12 VITALS
RESPIRATION RATE: 18 BRPM | OXYGEN SATURATION: 96 % | TEMPERATURE: 97 F | SYSTOLIC BLOOD PRESSURE: 118 MMHG | HEART RATE: 110 BPM | DIASTOLIC BLOOD PRESSURE: 70 MMHG

## 2024-01-12 DIAGNOSIS — D50.9 MATERNAL IRON DEFICIENCY ANEMIA AFFECTING PREGNANCY IN SECOND TRIMESTER, ANTEPARTUM: Primary | ICD-10-CM

## 2024-01-12 DIAGNOSIS — O99.012 MATERNAL IRON DEFICIENCY ANEMIA AFFECTING PREGNANCY IN SECOND TRIMESTER, ANTEPARTUM: Primary | ICD-10-CM

## 2024-01-12 PROCEDURE — 96374 THER/PROPH/DIAG INJ IV PUSH: CPT

## 2024-01-12 NOTE — PROGRESS NOTES
Education Record    Learner:  Patient    Disease / Diagnosis: Here for Venofer.     Barriers / Limitations:  None    Method:  Brief focused, printed material and  reinforcement    General Topics:  Plan of care reviewed    Outcome:  Shows understanding. Pt tolerated Venofer without issue. Left in stable condition.

## 2024-01-15 ENCOUNTER — NURSE ONLY (OUTPATIENT)
Dept: ENDOCRINOLOGY CLINIC | Facility: CLINIC | Age: 29
End: 2024-01-15
Payer: MEDICAID

## 2024-01-15 ENCOUNTER — APPOINTMENT (OUTPATIENT)
Dept: HEMATOLOGY/ONCOLOGY | Facility: HOSPITAL | Age: 29
End: 2024-01-15
Attending: OBSTETRICS & GYNECOLOGY
Payer: COMMERCIAL

## 2024-01-15 VITALS — WEIGHT: 160 LBS | BODY MASS INDEX: 29 KG/M2

## 2024-01-15 DIAGNOSIS — O24.410 DIET CONTROLLED GESTATIONAL DIABETES MELLITUS (GDM) IN SECOND TRIMESTER: Primary | ICD-10-CM

## 2024-01-15 NOTE — PROGRESS NOTES
Marielena Leroy  :1995 was seen for Gestational Diabetes Counseling: Individual/Group  Pt. verbally consents to a telemedicine service with live, interactive video and audio on 1/15/24. Patient understands and accepts financial responsibility for any deductible, co-insurance and/or co-pays associated with this service.   Date: 1/15/2024  Referring Provider: Dr. DAYANNA Conteh  Start time: 1:30pm End time: 2:30pm    Assessment:LMP 2023     : 1  Para: 0  MAK: 3/28/24    History of GDM: NO    GDM Screen: 193 mg/dl    3 HR GTT:  F: 98 mg/dl  1 HR:204 mg/dl  2 HR:205 mg/dl  3 HR:190 mg/dl    A1C: 5.3%      Not working  Obtained usual diet history: Eats 3 main meals  B: 11am 2 rye salmon avacado toast , boiled egg , vegetable salad   No snack  L: 3pm soups- chx or beef  w/carrots, onions, mushrooms , potatoes   D: 8pm Chx , beef, salad w/cucmbers , tomatotes(no rice) 2-3x pasta   HS snack: toast w/cheese, spread   Drinks 5-10 tea /wk (herbal)w/o sugar , 1 coffee /day w/o sugar & milk  , sparkling water ,   Eats out: cut back to 1x/wk . European food; no fast food or Asian     Exercise: walking around downtown , cleaning & when shopping    Education:     GDM Overview:  Reviewed gestational diabetes as diagnosis including target blood glucose values.  Benefits, risks, and management options for improving/maintaining glucose control to mother/baby discussed.    Healthy Eating:  Discussed nutrition concepts for pregnancy/healthy eating and effects of food on BG value.  Timing of meals; what is a carbohydrate, protein, fat.  Taught: Carb counting, label reading, meal planning.  Suggested Calorie Level: 2000    Being Active:  Benefits and effects of activity on BG discussed.  Reviewed types of recommended activity, duration, precautions, and when to call MD.    Monitoring:  Instructed on how to use glucose monitor/proper lancet disposal. Testing schedules are:   Fastin-95 mg/dL, Call MD is >105 md/dL  twice in 1 week   2 Hour Post Prandial:  120 md/dL, Call MD if >140 md/dL twice in 1 week.    Instructed /demonstrated ability to perform blood glucose testing on: Relion meter   Pt. Will forward blood sugar readings for review  Discussed monitoring ketones.    Taking Medication:  Reviewed when medication might be indicated.    Reducing Risk:  Effects of elevated blood glucose on mother/baby reviewed.  Discussed management (hyperglycemia, hypoglycemia, sick day, other) and when to call provider.  Post pregnancy management/prevention of Type 2 DM, and increased risk of having diabetes later in life reviewed.    Healthy Coping:  Family involvement/social support encouraged.  Identification of lifestyle behaviors willing to change discussed.    Training Tools Provided:  Foreign/Harsha Diabetes and Pregnancy: A guide to self management  BG Log Sheets    Recommendations:      1. Follow recommended meal plan.   2. Begin testing fasting glucose and 2 hour after meals   3. Bring glucose / food log to next MD office visit.   4. Encouraged activity if no restrictions.   5. Encouraged Marielena to call diabetes center with any questions or concerns.   6. Follow-up in 2 wks    Patient verbalized understanding and has no further questions at this time.    Evelin Newman RN, [facility]

## 2024-01-18 ENCOUNTER — ROUTINE PRENATAL (OUTPATIENT)
Facility: CLINIC | Age: 29
End: 2024-01-18
Payer: MEDICAID

## 2024-01-18 VITALS
SYSTOLIC BLOOD PRESSURE: 104 MMHG | WEIGHT: 165.81 LBS | HEIGHT: 62 IN | HEART RATE: 111 BPM | BODY MASS INDEX: 30.51 KG/M2 | DIASTOLIC BLOOD PRESSURE: 60 MMHG

## 2024-01-18 DIAGNOSIS — O24.410 DIET CONTROLLED GESTATIONAL DIABETES MELLITUS (GDM) IN SECOND TRIMESTER (HCC): ICD-10-CM

## 2024-01-18 DIAGNOSIS — Z34.03 ENCOUNTER FOR SUPERVISION OF NORMAL FIRST PREGNANCY IN THIRD TRIMESTER (HCC): Primary | ICD-10-CM

## 2024-01-18 DIAGNOSIS — Z3A.30 30 WEEKS GESTATION OF PREGNANCY (HCC): ICD-10-CM

## 2024-01-18 DIAGNOSIS — O30.042 DICHORIONIC DIAMNIOTIC TWIN PREGNANCY IN SECOND TRIMESTER (HCC): ICD-10-CM

## 2024-01-18 PROCEDURE — 99214 OFFICE O/P EST MOD 30 MIN: CPT | Performed by: OBSTETRICS & GYNECOLOGY

## 2024-01-23 ENCOUNTER — OFFICE VISIT (OUTPATIENT)
Dept: PERINATAL CARE | Facility: HOSPITAL | Age: 29
End: 2024-01-23
Attending: OBSTETRICS & GYNECOLOGY
Payer: COMMERCIAL

## 2024-01-23 VITALS
SYSTOLIC BLOOD PRESSURE: 110 MMHG | HEART RATE: 109 BPM | DIASTOLIC BLOOD PRESSURE: 71 MMHG | BODY MASS INDEX: 30 KG/M2 | HEIGHT: 62 IN

## 2024-01-23 DIAGNOSIS — O30.043 DICHORIONIC DIAMNIOTIC TWIN PREGNANCY IN THIRD TRIMESTER: Primary | ICD-10-CM

## 2024-01-23 DIAGNOSIS — O24.410 DIET CONTROLLED GESTATIONAL DIABETES MELLITUS (GDM) IN THIRD TRIMESTER: Primary | ICD-10-CM

## 2024-01-23 DIAGNOSIS — O30.043 DICHORIONIC DIAMNIOTIC TWIN PREGNANCY IN THIRD TRIMESTER: ICD-10-CM

## 2024-01-23 DIAGNOSIS — O24.410 DIET CONTROLLED GESTATIONAL DIABETES MELLITUS (GDM) IN THIRD TRIMESTER: ICD-10-CM

## 2024-01-23 PROCEDURE — 76816 OB US FOLLOW-UP PER FETUS: CPT | Performed by: OBSTETRICS & GYNECOLOGY

## 2024-01-23 PROCEDURE — 76819 FETAL BIOPHYS PROFIL W/O NST: CPT

## 2024-01-23 NOTE — PROGRESS NOTES
Outpatient Maternal-Fetal Medicine Follow-Up     Dear Dr. Doyle,     Thank you for requesting ultrasound evaluation and maternal fetal medicine consultation on your patient Marielena Leroy.  As you are aware she is a 28 year old female  with a TWIN pregnancy and an Estimated Date of Delivery: 3/28/24.  She returned to maternal-fetal medicine today for a follow-up visit.  Her history was reviewed from her prior visit and there were no interval changes.     Antepartum Risk Factors  Diamniotic, dichorionic twins  Alpha thalassemia carrier, father the baby has not been tested      S: She reports good fetal movement.  She reports doing well on the GDM diet.    PHYSICAL EXAMINATION:  /71 (BP Location: Right arm, Patient Position: Sitting, Cuff Size: adult)   Pulse 109   Ht 5' 2\" (1.575 m)   LMP 2023   BMI 30.33 kg/m²   General: alert and oriented, no acute distress  Abdomen: gravid, soft, non-tender  Extremities: non-tender, no edema     OBSTETRIC ULTRASOUND  The patient had a twin growth ultrasound today which I interpreted the results and reviewed them with the patient.    Ultrasound findings:   Twin A - IUP in cephalic presentation.    Placenta is posterior.   Cardiac activity is present 153 bpm  EFW 1492 g ( 3 lb 5 oz); 38%.   MVP is 5 cm.  BPP is 8/8     TWIN A: The fetal measurements are consistent with established EDC. No gross ultrasound evidence of structural abnormalities are seen today. The patient understands that ultrasound cannot rule out all structural and chromosomal abnormalities.    Twin B - IUP in cephalic presentation.   Placenta is anterior.  Cardiac activity is present 135 bpm  EFW 1652 g (3 lb 10 oz); 51%.   MVP is 6.1 cm.   BPP is 8/8    TWIN B: The fetal measurements are consistent with established EDC. No gross ultrasound evidence of structural abnormalities are seen today. The patient understands that ultrasound cannot rule out all structural and chromosomal  abnormalities.    Discordance - 9.7 %     See imaging tab for the complete US report.     DISCUSSION  During her visit we discussed and reviewed the following issues:  DIAMNIOTIC DICHORIONIC TWIN GESTATION  The increased  morbidity and mortality associated with dichorionic twins was discussed previously and reviewed.  Reviewed the plan of care.  See [facility] note from 11/15/2023 for detailed review.     Prevention of Preeclampsia  The role of low-dose aspirin for the rest of preeclampsia was discussed previously and reviewed.  See [facility] note from 11/15/2023 for detailed review.    GESTATIONAL DIABETES    3 hour GTT  Lab Results   Component Value Date    GLUFG 98 (H) 2023    GLU1G 204 (H) 2023    GLU2G 205 (H) 2023    GLU3G 190 (H) 2023       The patient was informed of the potential implications and risks associated with GDM to her and her fetus, especially when poorly controlled. We discussed the increased incidence of macrosomia and the potential for related birth injury to her and her baby. We talked about the increase risks associated risk of fetal hyperinsulinemia, jaundice, electrolyte imbalance, seizure activity, IUFD and other adverse obstetric outcomes. We also reviewed her  increased risk of having diabetes later in life. The importance of good glycemic control and avoidance of prolonged hypoglycemia and hyperglycemia was addressed.    Her capillary blood glucose records were reviewed.  Most of her readings are in range.  Occasional FBS between  but most are <95 mg/dL.  She occasionally has a postmeal reading in the 120s but most of them are below 120..      She met with the dietitian in January 15.  We reviewed her education and she had no additional questions.  She is testing her blood sugar 4 times daily we will be sending the to [facility] weekly for review.    We compared and contrasted insulin (long and short acting) and metformin to manage blood sugars in pregnancy.  We  discussed insulin as the gold standard therapy in pregnancy and that it does not cross to the fetal circulation.  Metformin is increasingly being used in pregnancy but the long term data on / childhood effects are lacking.  Metformin crosses the placenta and  levels are >70% of the maternal level at delivery.  Metformin is not associated with increased rates for NICU admission,  hypoglycemia or LGA when its use controls the blood sugars to the recommended targets.  There is emerging information,however, that there is an increase in childhood obesity, and possibly metabolic syndrome in children exposed to Metformin throughout the entire pregnancy.  Currently, use in the third trimester for management of gestational diabetes is within the standard of care.       Medical Regimen Recommendation:   Continue ADA diet & BS monitoring  Send blood sugar logs to [facility] weekly for review        We reviewed the signs and symptoms of preeclampsia,  labor and monitoring fetal movement.  We discussed reasons for her to call her physician.    They had questions about timing of delivery for twins.  I explained the rationale for recommending delivery at 38 weeks for uncomplicated dichorionic, diamniotic twins.  They asked if they could go to 39 weeks or longer.  She has had a couple of friends who recently had desouza gestations that were allowed to progress to spontaneous labor and went past 42 weeks.  I reviewed with her that my recommendation would be that she should have an induction at 38 weeks if she has not delivered.    We discussed the recommended plan of care based on her  risk factors.  Marielena and her significant other, Darien, had their questions answered to their satisfaction.    IMPRESSION:  IUP at 30w5d; cephalic/ cephalic  Dichorionic twin gestation  Concordant fetal growth  Gestational diabetes, A1     RECOMMENDATIONS:  Continue care with Dr. Troncoso  BMI 25-30:  31-50  lb  Monthly growth US with BPP at or beyond 32 weeks  Weekly NSTs at 32 weeks  Delivery at 38-38w6d.  Consider Low dose aspirin  mg daily until 37 weeks  I advised her to continue checking her fasting blood sugars and 2 hours postprandial and send them to [facility] via [facility] weekly for review         Total time spent 40 minutes this calendar day which includes preparing to see the patient including chart review, obtaining and/or reviewing additional medical history, performing a physical exam and evaluation, documenting clinical information in the electronic medical record, independently interpreting results, counseling the patient, communicating results to the patient/family/caregiver and coordinating care.     Case discussed with patient who demonstrated understanding and agreement with plan.     Thank you for allowing me to participate in the care of this patient.  Please feel free to contact me with any questions.    Gloria Gan MD  Maternal-Fetal Medicine       Note to patient and family:  The 21st Century Cures Act makes medical notes available to patients in the interest of transparency.  However, please be advised that this is a medical document.  It is intended as a peer to peer communication.  It is written in medical language and may contain abbreviations or verbiage that are technical and unfamiliar.  It may appear blunt or direct.  Medical documents are intended to carry relevant information, facts as evident, and the clinical opinion of the practitioner.

## 2024-02-05 ENCOUNTER — TELEPHONE (OUTPATIENT)
Dept: PERINATAL CARE | Facility: HOSPITAL | Age: 29
End: 2024-02-05

## 2024-02-06 ENCOUNTER — ROUTINE PRENATAL (OUTPATIENT)
Facility: CLINIC | Age: 29
End: 2024-02-06
Payer: COMMERCIAL

## 2024-02-06 VITALS
BODY MASS INDEX: 31.32 KG/M2 | WEIGHT: 170.19 LBS | HEIGHT: 62 IN | HEART RATE: 111 BPM | DIASTOLIC BLOOD PRESSURE: 69 MMHG | SYSTOLIC BLOOD PRESSURE: 108 MMHG

## 2024-02-06 DIAGNOSIS — Z3A.32 32 WEEKS GESTATION OF PREGNANCY: ICD-10-CM

## 2024-02-06 DIAGNOSIS — O30.042 DICHORIONIC DIAMNIOTIC TWIN PREGNANCY IN SECOND TRIMESTER: ICD-10-CM

## 2024-02-06 DIAGNOSIS — O24.410 DIET CONTROLLED GESTATIONAL DIABETES MELLITUS (GDM) IN SECOND TRIMESTER: ICD-10-CM

## 2024-02-06 DIAGNOSIS — Z34.03 ENCOUNTER FOR SUPERVISION OF NORMAL FIRST PREGNANCY IN THIRD TRIMESTER: Primary | ICD-10-CM

## 2024-02-06 PROCEDURE — 3008F BODY MASS INDEX DOCD: CPT | Performed by: OBSTETRICS & GYNECOLOGY

## 2024-02-06 PROCEDURE — 59025 FETAL NON-STRESS TEST: CPT | Performed by: OBSTETRICS & GYNECOLOGY

## 2024-02-06 PROCEDURE — 3078F DIAST BP <80 MM HG: CPT | Performed by: OBSTETRICS & GYNECOLOGY

## 2024-02-06 PROCEDURE — 3074F SYST BP LT 130 MM HG: CPT | Performed by: OBSTETRICS & GYNECOLOGY

## 2024-02-06 RX ORDER — PANTOPRAZOLE SODIUM 40 MG/1
40 TABLET, DELAYED RELEASE ORAL DAILY
Qty: 30 TABLET | Refills: 11 | Status: SHIPPED | OUTPATIENT
Start: 2024-02-06
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
## 2024-02-12 ENCOUNTER — TELEPHONE (OUTPATIENT)
Dept: PERINATAL CARE | Facility: HOSPITAL | Age: 29
End: 2024-02-12

## 2024-02-13 ENCOUNTER — ROUTINE PRENATAL (OUTPATIENT)
Facility: CLINIC | Age: 29
End: 2024-02-13
Payer: COMMERCIAL

## 2024-02-13 VITALS
SYSTOLIC BLOOD PRESSURE: 116 MMHG | HEART RATE: 111 BPM | HEIGHT: 62 IN | DIASTOLIC BLOOD PRESSURE: 72 MMHG | BODY MASS INDEX: 31.25 KG/M2 | WEIGHT: 169.81 LBS

## 2024-02-13 DIAGNOSIS — O30.049 DICHORIONIC DIAMNIOTIC TWIN PREGNANCY, ANTEPARTUM: Primary | ICD-10-CM

## 2024-02-13 PROCEDURE — 3074F SYST BP LT 130 MM HG: CPT | Performed by: STUDENT IN AN ORGANIZED HEALTH CARE EDUCATION/TRAINING PROGRAM

## 2024-02-13 PROCEDURE — 3078F DIAST BP <80 MM HG: CPT | Performed by: STUDENT IN AN ORGANIZED HEALTH CARE EDUCATION/TRAINING PROGRAM

## 2024-02-13 PROCEDURE — 3008F BODY MASS INDEX DOCD: CPT | Performed by: STUDENT IN AN ORGANIZED HEALTH CARE EDUCATION/TRAINING PROGRAM

## 2024-02-13 PROCEDURE — 59025 FETAL NON-STRESS TEST: CPT | Performed by: STUDENT IN AN ORGANIZED HEALTH CARE EDUCATION/TRAINING PROGRAM
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
## 2024-02-13 NOTE — PROGRESS NOTES
Outpatient Maternal-Fetal Medicine Follow-Up     Dear Dr. Doyle,     Thank you for requesting ultrasound evaluation and maternal fetal medicine consultation on your patient Marielena Leroy.  As you are aware she is a 28 year old female  with a TWIN pregnancy and an Estimated Date of Delivery: 3/28/24.  She returned to maternal-fetal medicine today for a follow-up visit.  Her history was reviewed from her prior visit and there were no interval changes.     Antepartum Risk Factors  Diamniotic, dichorionic twins  Alpha thalassemia carrier, father the baby has not been tested     PHYSICAL EXAMINATION:  /78 (BP Location: Right arm, Patient Position: Sitting, Cuff Size: adult)   Pulse 106   Wt 171 lb (77.6 kg)   LMP 2023   BMI 31.28 kg/m²   General: alert and oriented, no acute distress  Abdomen: gravid, soft, non-tender  Extremities: non-tender, no edema     OBSTETRIC ULTRASOUND  The patient had a twin growth ultrasound today which I interpreted the results and reviewed them with the patient.     Ultrasound findings:   Twin A - IUP in cephalic presentation.    Placenta is posterior.   Cardiac activity is present, 144 bpm  EFW 2541 g ( 5 lb 10 oz); 48%.   MVP is 5.9 cm. BPP is 8/8.  UA Doppler 3.28.      Normal UA Doppler.     TWIN A: The fetal measurements are consistent with established EDC. No gross ultrasound evidence of structural abnormalities are seen today. The patient understands that ultrasound cannot rule out all structural and chromosomal abnormalities.    Twin B - IUP in breech presentation.   Placenta is anterior.  Cardiac activity is present, 139 bpm  EFW 2016 g (4 lb 7 oz); 14%. (AC 3%)  MVP is 6.9 cm. BPP is 8/8.  UA Doppler 2.24 .      Normal UA Doppler.    TWIN B: The fetal measurements are consistent with established EDC. No gross ultrasound evidence of structural abnormalities are seen today. The patient understands that ultrasound cannot rule out all structural and chromosomal  abnormalities.    Discordance - 20.7 %     See imaging tab for the complete US report.     DISCUSSION  During her visit we discussed and reviewed the following issues:  DIAMNIOTIC DICHORIONIC TWIN GESTATION  The increased  morbidity and mortality associated with dichorionic twins was discussed previously and reviewed.  Reviewed the plan of care.  See [facility] note from 11/15/2023 for detailed review.     Prevention of Preeclampsia  The role of low-dose aspirin for the rest of preeclampsia was discussed previously and reviewed.  See M note from 11/15/2023 for detailed review.     GESTATIONAL DIABETES - follow-up  ADA diet recommendations were reviewed and the patient's dietary compliance is good.  Her capillary blood glucose records were reviewed today; her compliance with the recommended four times daily assessments (fasting and two-hour post-prandial) is good.   Her overall glucose control is good.    Medical Regimen Recommendation: no change.    Continued medication use and capillary blood glucose assessments were reviewed as well as recommendations for serial growth ultrasounds and antepartum testing.    FETAL GROWTH RESTRICTION - TWIN B    DISCUSSION    Background  Fetal growth restriction (FGR) is the final manifestation of a variety of maternal, fetal, and placental conditions. Fetal growth restriction occurs in up to 10% of pregnancies and is second to premature birth as a cause of infant morbidity and mortality. In addition to its significant  impact, FGR also has an impact on long-term health outcomes. Fetal growth restriction remains a complex obstetric problem with disparate published diagnostic criteria, poor detection rates, and limited preventative and treatment options.     Definition  FGR is defined as a sonographic estimated fetal weight (EFW) or abdominal circumference (AC) below the 10th percentile for gestational age.    Classification  Timing  Early onset: < 32 weeks  Late-onset:  ? 32 weeks    Severity  Severe: EFW<3%    Management  General Considerations  Management of FGR is based on early diagnosis, optimal fetal surveillance, and timely delivery that reduces  mortality and minimizes short- and long-term morbidity.     Evaluation  A detailed sonogram is advised as up to 20% of cases of early onset FGR are associated with fetal or chromosome abnormalities. In addition, evaluation for chromosomal abnormalities should also be offered to patients with early-onset FGR or those with FGR with a fetal malformation or polyhydramnios Diagnostic testing should include chromosome microarray analysis (CMA) and PCR for CMV.    Umbilical artery Dopplers, amniotic fluid volume (AFV) assessment and cardiotocography are the primary tools to assess fetal well-being in FGR fetuses.    Other testing modalities (including BPP, ductus venosus Dopplers, middle cerebral artery Dopplers and uterine artery Dopplers) have not adequately demonstrated benefit in improving outcomes of pregnancies complicated by FGR .    Guidelines    Initial Management   Detailed obstetric ultrasound examination (CPT code 39524)  Diagnostic Genetic Testing (CMA) for:  early-onset FGR   Sonographic abnormalities  Polyhydramnios  PCR CMV if patient has amniocentesis  UA Doppler  CTG  Deliver for repetitive late decelerations on CTG  Subsequent management based upon EFW, AC and UA Dopplers    Category  FGR (AC < 10%) with NORMAL UA Dopplers    IMPRESSION:  IUP at 34w5d; cephalic/ cephalic  Dichorionic twin gestation  FGR - TWIN B - normal UA Dopplers  Gestational diabetes, A1     RECOMMENDATIONS:  Continue care with Dr. Troncoso  Weekly NST's  Repeat UA Dopplers & AFV in 1-2 weeks; if normal repeat Dopplers with each (q 3 week) growth ultrasound  Follow-up growth BPP and Dopplers if undelivered in 3 weeks  Weekly NSTs at 32 weeks -    Delivery at 37-38 weeks -in light of IUGR of twin B - patient hesitant to proceed with delivery  earlier  Consider Low dose aspirin  mg daily until 37 weeks  Continue four times daily capillary blood glucose assessments (fasting and 2 hour postprandial)  Weekly Maternal-Fetal Medicine review of capillary blood glucose values    Thank you for allowing me to participate in the care of this patient.  Please feel free to contact me with any questions.     Buddy Yepez M.D.  Maternal-Fetal Medicine    40 minutes spent in review of records, patient consultation, documentation and coordination of care.  The relevant clinical matter(s) are summarized above.      Note to patient and family:  The 21st Century Cures Act makes medical notes available to patients in the interest of transparency.  However, please be advised that this is a medical document.  It is intended as a peer to peer communication.  It is written in medical language and may contain abbreviations or verbiage that are technical and unfamiliar.  It may appear blunt or direct.  Medical documents are intended to carry relevant information, facts as evident, and the clinical opinion of the practitioner.
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
## 2024-02-20 ENCOUNTER — OFFICE VISIT (OUTPATIENT)
Dept: PERINATAL CARE | Facility: HOSPITAL | Age: 29
End: 2024-02-20
Attending: OBSTETRICS & GYNECOLOGY
Payer: COMMERCIAL

## 2024-02-20 VITALS
HEART RATE: 106 BPM | BODY MASS INDEX: 31 KG/M2 | SYSTOLIC BLOOD PRESSURE: 122 MMHG | DIASTOLIC BLOOD PRESSURE: 78 MMHG | WEIGHT: 171 LBS

## 2024-02-20 DIAGNOSIS — O36.5932 POOR FETAL GROWTH AFFECTING MANAGEMENT OF MOTHER IN THIRD TRIMESTER, FETUS 2 OF MULTIPLE GESTATION (HCC): ICD-10-CM

## 2024-02-20 DIAGNOSIS — O30.043 DICHORIONIC DIAMNIOTIC TWIN PREGNANCY IN THIRD TRIMESTER (HCC): Primary | ICD-10-CM

## 2024-02-20 DIAGNOSIS — O24.410 DIET CONTROLLED GESTATIONAL DIABETES MELLITUS (GDM) IN THIRD TRIMESTER (HCC): ICD-10-CM

## 2024-02-20 DIAGNOSIS — O30.043 DICHORIONIC DIAMNIOTIC TWIN PREGNANCY IN THIRD TRIMESTER (HCC): ICD-10-CM

## 2024-02-20 PROBLEM — O36.5930 POOR FETAL GROWTH AFFECTING MANAGEMENT OF MOTHER IN THIRD TRIMESTER (HCC): Status: ACTIVE | Noted: 2024-02-20

## 2024-02-20 PROCEDURE — 76816 OB US FOLLOW-UP PER FETUS: CPT | Performed by: OBSTETRICS & GYNECOLOGY

## 2024-02-20 PROCEDURE — 76819 FETAL BIOPHYS PROFIL W/O NST: CPT

## 2024-02-20 PROCEDURE — 76820 UMBILICAL ARTERY ECHO: CPT

## 2024-02-22 ENCOUNTER — ROUTINE PRENATAL (OUTPATIENT)
Facility: CLINIC | Age: 29
End: 2024-02-22
Payer: COMMERCIAL

## 2024-02-22 VITALS
WEIGHT: 173 LBS | HEIGHT: 62 IN | BODY MASS INDEX: 31.83 KG/M2 | DIASTOLIC BLOOD PRESSURE: 70 MMHG | HEART RATE: 84 BPM | SYSTOLIC BLOOD PRESSURE: 104 MMHG

## 2024-02-22 DIAGNOSIS — D50.9 MATERNAL IRON DEFICIENCY ANEMIA AFFECTING PREGNANCY IN THIRD TRIMESTER, ANTEPARTUM (HCC): ICD-10-CM

## 2024-02-22 DIAGNOSIS — O30.043 DICHORIONIC DIAMNIOTIC TWIN PREGNANCY IN THIRD TRIMESTER (HCC): Primary | ICD-10-CM

## 2024-02-22 DIAGNOSIS — Z86.19 HISTORY OF SYPHILIS: ICD-10-CM

## 2024-02-22 DIAGNOSIS — O24.410 DIET CONTROLLED GESTATIONAL DIABETES MELLITUS (GDM) IN THIRD TRIMESTER (HCC): ICD-10-CM

## 2024-02-22 DIAGNOSIS — O99.013 MATERNAL IRON DEFICIENCY ANEMIA AFFECTING PREGNANCY IN THIRD TRIMESTER, ANTEPARTUM (HCC): ICD-10-CM

## 2024-02-22 DIAGNOSIS — O36.5932 POOR FETAL GROWTH AFFECTING MANAGEMENT OF MOTHER IN THIRD TRIMESTER, FETUS 2 OF MULTIPLE GESTATION (HCC): ICD-10-CM

## 2024-02-22 DIAGNOSIS — D56.3 ALPHA THALASSEMIA TRAIT: ICD-10-CM

## 2024-02-22 PROBLEM — O26.893 RH NEGATIVE STATUS DURING PREGNANCY IN THIRD TRIMESTER (HCC): Status: ACTIVE | Noted: 2023-09-16

## 2024-02-22 PROBLEM — Z75.8 LANGUAGE BARRIER: Status: RESOLVED | Noted: 2024-01-03 | Resolved: 2024-02-22

## 2024-02-22 PROBLEM — Z67.91 RH NEGATIVE STATUS DURING PREGNANCY IN THIRD TRIMESTER (HCC): Status: ACTIVE | Noted: 2023-09-16

## 2024-02-22 PROBLEM — O09.892: Status: RESOLVED | Noted: 2024-01-03 | Resolved: 2024-02-22

## 2024-02-22 PROBLEM — Z60.3 LANGUAGE BARRIER: Status: RESOLVED | Noted: 2024-01-03 | Resolved: 2024-02-22

## 2024-02-22 PROCEDURE — 3008F BODY MASS INDEX DOCD: CPT | Performed by: OBSTETRICS & GYNECOLOGY

## 2024-02-22 PROCEDURE — 3078F DIAST BP <80 MM HG: CPT | Performed by: OBSTETRICS & GYNECOLOGY

## 2024-02-22 PROCEDURE — 3074F SYST BP LT 130 MM HG: CPT | Performed by: OBSTETRICS & GYNECOLOGY

## 2024-02-22 PROCEDURE — 59025 FETAL NON-STRESS TEST: CPT | Performed by: OBSTETRICS & GYNECOLOGY
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
## 2024-02-23 ENCOUNTER — TELEPHONE (OUTPATIENT)
Facility: CLINIC | Age: 29
End: 2024-02-23
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
## 2024-02-29 ENCOUNTER — ROUTINE PRENATAL (OUTPATIENT)
Facility: CLINIC | Age: 29
End: 2024-02-29
Payer: COMMERCIAL

## 2024-02-29 VITALS
BODY MASS INDEX: 31.62 KG/M2 | DIASTOLIC BLOOD PRESSURE: 72 MMHG | HEART RATE: 96 BPM | SYSTOLIC BLOOD PRESSURE: 118 MMHG | WEIGHT: 171.81 LBS | HEIGHT: 62 IN

## 2024-02-29 DIAGNOSIS — Z3A.36 36 WEEKS GESTATION OF PREGNANCY (HCC): ICD-10-CM

## 2024-02-29 DIAGNOSIS — O30.043 DICHORIONIC DIAMNIOTIC TWIN PREGNANCY IN THIRD TRIMESTER (HCC): Primary | ICD-10-CM

## 2024-02-29 PROCEDURE — 87150 DNA/RNA AMPLIFIED PROBE: CPT

## 2024-02-29 PROCEDURE — 59025 FETAL NON-STRESS TEST: CPT

## 2024-02-29 PROCEDURE — 87081 CULTURE SCREEN ONLY: CPT
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
## 2024-03-02 LAB — GROUP B STREP BY PCR FOR PCR OVT: NEGATIVE

## 2024-03-04 ENCOUNTER — TELEPHONE (OUTPATIENT)
Dept: OBGYN UNIT | Facility: HOSPITAL | Age: 29
End: 2024-03-04

## 2024-03-04 ENCOUNTER — TELEPHONE (OUTPATIENT)
Facility: CLINIC | Age: 29
End: 2024-03-04

## 2024-03-04 ENCOUNTER — ROUTINE PRENATAL (OUTPATIENT)
Facility: CLINIC | Age: 29
End: 2024-03-04
Payer: MEDICAID

## 2024-03-04 VITALS
DIASTOLIC BLOOD PRESSURE: 52 MMHG | BODY MASS INDEX: 31.83 KG/M2 | WEIGHT: 173 LBS | HEART RATE: 106 BPM | SYSTOLIC BLOOD PRESSURE: 118 MMHG | HEIGHT: 62 IN

## 2024-03-04 DIAGNOSIS — Z34.03 ENCOUNTER FOR SUPERVISION OF NORMAL FIRST PREGNANCY IN THIRD TRIMESTER (HCC): Primary | ICD-10-CM

## 2024-03-04 DIAGNOSIS — O36.5932 POOR FETAL GROWTH AFFECTING MANAGEMENT OF MOTHER IN THIRD TRIMESTER, FETUS 2 OF MULTIPLE GESTATION (HCC): ICD-10-CM

## 2024-03-04 DIAGNOSIS — O30.043 DICHORIONIC DIAMNIOTIC TWIN PREGNANCY IN THIRD TRIMESTER (HCC): ICD-10-CM

## 2024-03-04 DIAGNOSIS — Z3A.37 37 WEEKS GESTATION OF PREGNANCY (HCC): ICD-10-CM

## 2024-03-04 DIAGNOSIS — O24.410 DIET CONTROLLED GESTATIONAL DIABETES MELLITUS (GDM) IN THIRD TRIMESTER (HCC): ICD-10-CM

## 2024-03-04 PROCEDURE — 59025 FETAL NON-STRESS TEST: CPT | Performed by: OBSTETRICS & GYNECOLOGY

## 2024-03-04 NOTE — TELEPHONE ENCOUNTER
Breast Pump order was received from [facility].  Order form was placed in Dr. Marielena Doyle's bin for signature.

## 2024-03-05 ENCOUNTER — OFFICE VISIT (OUTPATIENT)
Dept: PERINATAL CARE | Facility: HOSPITAL | Age: 29
End: 2024-03-05
Attending: OBSTETRICS & GYNECOLOGY
Payer: COMMERCIAL

## 2024-03-05 VITALS
DIASTOLIC BLOOD PRESSURE: 73 MMHG | SYSTOLIC BLOOD PRESSURE: 108 MMHG | BODY MASS INDEX: 32.02 KG/M2 | HEART RATE: 86 BPM | HEIGHT: 62 IN | WEIGHT: 174 LBS

## 2024-03-05 DIAGNOSIS — O24.410 DIET CONTROLLED GESTATIONAL DIABETES MELLITUS (GDM) IN THIRD TRIMESTER (HCC): ICD-10-CM

## 2024-03-05 DIAGNOSIS — O30.043 DICHORIONIC DIAMNIOTIC TWIN PREGNANCY IN THIRD TRIMESTER (HCC): ICD-10-CM

## 2024-03-05 DIAGNOSIS — O36.5932 POOR FETAL GROWTH AFFECTING MANAGEMENT OF MOTHER IN THIRD TRIMESTER, FETUS 2 OF MULTIPLE GESTATION (HCC): ICD-10-CM

## 2024-03-05 DIAGNOSIS — O30.043 DICHORIONIC DIAMNIOTIC TWIN PREGNANCY IN THIRD TRIMESTER (HCC): Primary | ICD-10-CM

## 2024-03-05 PROCEDURE — 76819 FETAL BIOPHYS PROFIL W/O NST: CPT | Performed by: OBSTETRICS & GYNECOLOGY

## 2024-03-05 PROCEDURE — 76820 UMBILICAL ARTERY ECHO: CPT

## 2024-03-05 PROCEDURE — 76815 OB US LIMITED FETUS(S): CPT

## 2024-03-05 NOTE — PROGRESS NOTES
Outpatient Maternal-Fetal Medicine Follow-Up     Dear Dr. Doyle,     Thank you for requesting ultrasound evaluation and maternal fetal medicine consultation on your patient Marielena Leroy.  As you are aware she is a 28 year old female  with a TWIN pregnancy and an Estimated Date of Delivery: 3/28/24.  She returned to maternal-fetal medicine today for a follow-up visit.  Her history was reviewed from her prior visit and there were no interval changes.     Antepartum Risk Factors  Diamniotic, dichorionic twins  Alpha thalassemia carrier, father the baby has not been tested  Twin B -fetal growth restriction and normal Doppler Doppler  S:  Good FM.  Hoping for twin B to turn head down as she would prefer not to  have a .    She reports that she has a growth ultrasound scheduled for  and a  from .  We reviewed delivery recommendations for twins and twin gestations with a concern about fetal growth.  She and her  wanted to delay the delivery as long as possible unless the fetal testing indicates earlier delivery is needed.     PHYSICAL EXAMINATION:  /73 (BP Location: Right arm, Patient Position: Sitting, Cuff Size: adult)   Pulse 86   Ht 5' 2\" (1.575 m)   Wt 174 lb (78.9 kg)   LMP 2023   BMI 31.83 kg/m²   General: alert and oriented, no acute distress  Abdomen: gravid, soft, non-tender  Extremities: non-tender, no edema     OBSTETRIC ULTRASOUND  The patient had a twin UA Doppler & BPP ultrasound today which I interpreted the results and reviewed them with the patient.     Ultrasound findings:     Twin A - IUP in cephalic presentation.    Placenta is posterior.   Cardiac activity is present, 143 bpm  MVP is 5.9 cm. BPP is 8/8.  UA Doppler 2.69.      Normal UA Doppler.    Twin B - IUP in breech presentation.   Placenta is anterior.  Cardiac activity is present, 126 bpm  MVP is 6.6 cm. BPP is 8/8.  UA Doppler 2.37 .      Normal UA Doppler.    See imaging tab  for the complete US report.     DISCUSSION  During her visit we discussed and reviewed the following issues:  We discussed maternal positioning exercises to help spontaneous conversion to cephalic presentations.  I explained that this has not been studied in twins but it was studied in desouza gestations and showed some benefit    DIAMNIOTIC DICHORIONIC TWIN GESTATION  The increased  morbidity and mortality associated with dichorionic twins was discussed previously and reviewed.  Reviewed the plan of care.  See M note from 11/15/2023 for detailed review.     Prevention of Preeclampsia  The role of low-dose aspirin for the rest of preeclampsia was discussed previously and reviewed.  See M note from 11/15/2023 for detailed review.     GESTATIONAL DIABETES - follow-up  ADA diet recommendations were reviewed and the patient's dietary compliance is good.  Her capillary blood glucose records were reviewed today; her compliance with the recommended four times daily assessments (fasting and two-hour post-prandial) is good.   Her overall glucose control is good.    Medical Regimen Recommendation: no change.  Continue management with diet    Continued medication use and capillary blood glucose assessments were reviewed as well as recommendations for serial growth ultrasounds and antepartum testing.    FETAL GROWTH RESTRICTION - TWIN B  Discussed previously and reviewed.  See [facility] note from 2024 for detailed review.    Category  FGR (AC < 10%) with NORMAL UA Dopplers    IMPRESSION:  IUP at 36w5d; cephalic/ cephalic  Dichorionic twin gestation  TWIN B - FGR (lagging AC noted at 34+ weeks); Breech;  normal UA Dopplers; reassuring  testing  Gestational diabetes, A1  Twin A - reassuring  testing; normal umbilical artery Doppler     RECOMMENDATIONS:  Continue care with Dr. Troncoso  Weekly NST's  Follow-up growth BPP and Dopplers if undelivered in 3 weeks (Next week)  Delivery at 37-38 weeks -in  light of IUGR of twin B - patient hesitant to proceed with delivery earlier  Consider Low dose aspirin  mg daily until 37 weeks  Continue four times daily capillary blood glucose assessments (fasting and 2 hour postprandial)  Weekly Maternal-Fetal Medicine review of capillary blood glucose values    Thank you for allowing me to participate in the care of this patient.  Please feel free to contact me with any questions.     Gloria Gan M.D.  Maternal-Fetal Medicine    30 minutes spent in review of records, patient consultation, documentation and coordination of care.  The relevant clinical matter(s) are summarized above.      Note to patient and family:  The 21st Century Cures Act makes medical notes available to patients in the interest of transparency.  However, please be advised that this is a medical document.  It is intended as a peer to peer communication.  It is written in medical language and may contain abbreviations or verbiage that are technical and unfamiliar.  It may appear blunt or direct.  Medical documents are intended to carry relevant information, facts as evident, and the clinical opinion of the practitioner.
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
## 2024-03-12 ENCOUNTER — OFFICE VISIT (OUTPATIENT)
Dept: PERINATAL CARE | Facility: HOSPITAL | Age: 29
End: 2024-03-12
Attending: OBSTETRICS & GYNECOLOGY
Payer: COMMERCIAL

## 2024-03-12 ENCOUNTER — TELEPHONE (OUTPATIENT)
Facility: CLINIC | Age: 29
End: 2024-03-12

## 2024-03-12 ENCOUNTER — TELEPHONE (OUTPATIENT)
Dept: PERINATAL CARE | Facility: HOSPITAL | Age: 29
End: 2024-03-12

## 2024-03-12 ENCOUNTER — PATIENT MESSAGE (OUTPATIENT)
Dept: PERINATAL CARE | Facility: HOSPITAL | Age: 29
End: 2024-03-12

## 2024-03-12 DIAGNOSIS — O24.410 DIET CONTROLLED GESTATIONAL DIABETES MELLITUS (GDM) IN THIRD TRIMESTER (HCC): ICD-10-CM

## 2024-03-12 DIAGNOSIS — O30.043 DICHORIONIC DIAMNIOTIC TWIN PREGNANCY IN THIRD TRIMESTER (HCC): Primary | ICD-10-CM

## 2024-03-12 DIAGNOSIS — O30.043 DICHORIONIC DIAMNIOTIC TWIN PREGNANCY IN THIRD TRIMESTER (HCC): ICD-10-CM

## 2024-03-12 DIAGNOSIS — O36.5932 POOR FETAL GROWTH AFFECTING MANAGEMENT OF MOTHER IN THIRD TRIMESTER, FETUS 2 OF MULTIPLE GESTATION (HCC): ICD-10-CM

## 2024-03-12 PROCEDURE — 76819 FETAL BIOPHYS PROFIL W/O NST: CPT

## 2024-03-12 PROCEDURE — 76816 OB US FOLLOW-UP PER FETUS: CPT | Performed by: OBSTETRICS & GYNECOLOGY

## 2024-03-12 PROCEDURE — 76820 UMBILICAL ARTERY ECHO: CPT

## 2024-03-13 ENCOUNTER — OFFICE VISIT (OUTPATIENT)
Dept: PERINATAL CARE | Facility: HOSPITAL | Age: 29
End: 2024-03-13
Attending: OBSTETRICS & GYNECOLOGY
Payer: COMMERCIAL

## 2024-03-13 VITALS
HEART RATE: 90 BPM | HEIGHT: 62 IN | SYSTOLIC BLOOD PRESSURE: 124 MMHG | BODY MASS INDEX: 31.83 KG/M2 | WEIGHT: 173 LBS | DIASTOLIC BLOOD PRESSURE: 87 MMHG

## 2024-03-13 DIAGNOSIS — O24.410 DIET CONTROLLED GESTATIONAL DIABETES MELLITUS (GDM) IN THIRD TRIMESTER (HCC): ICD-10-CM

## 2024-03-13 DIAGNOSIS — O36.5932 POOR FETAL GROWTH AFFECTING MANAGEMENT OF MOTHER IN THIRD TRIMESTER, FETUS 2 OF MULTIPLE GESTATION (HCC): ICD-10-CM

## 2024-03-13 DIAGNOSIS — O30.043 DICHORIONIC DIAMNIOTIC TWIN PREGNANCY IN THIRD TRIMESTER (HCC): Primary | ICD-10-CM

## 2024-03-13 DIAGNOSIS — O30.043 DICHORIONIC DIAMNIOTIC TWIN PREGNANCY IN THIRD TRIMESTER (HCC): ICD-10-CM

## 2024-03-13 PROCEDURE — 76816 OB US FOLLOW-UP PER FETUS: CPT | Performed by: OBSTETRICS & GYNECOLOGY

## 2024-03-13 PROCEDURE — 76820 UMBILICAL ARTERY ECHO: CPT

## 2024-03-13 PROCEDURE — 76819 FETAL BIOPHYS PROFIL W/O NST: CPT

## 2024-03-13 NOTE — PROGRESS NOTES
Pt here for Growth Ultrasound/Di Di Twins  +fm noted both babies per patient  Pt denies bleeding, srom, uc's.   Pt denies complaints.

## 2024-03-13 NOTE — PROGRESS NOTES
Outpatient Maternal-Fetal Medicine Follow-Up     Dear Dr. Doyle,     Thank you for requesting ultrasound evaluation and maternal fetal medicine consultation on your patient Marielena Leroy.  As you are aware she is a 28 year old female  with a TWIN pregnancy and an Estimated Date of Delivery: 3/28/24.  She returned to maternal-fetal medicine today for a follow-up visit.  Her history was reviewed from her prior visit and there were no interval changes.     Antepartum Risk Factors  Diamniotic, dichorionic twins  Alpha thalassemia carrier, father the baby has not been tested  Twin B -fetal growth restriction and normal Doppler Doppler  S:  She wants to be delivered as close to 39 weeks as she can.       PHYSICAL EXAMINATION:  /87 (BP Location: Right arm, Patient Position: Sitting, Cuff Size: adult)   Pulse 90   Ht 5' 2\" (1.575 m)   Wt 173 lb (78.5 kg)   LMP 2023   BMI 31.64 kg/m²   General: alert and oriented, no acute distress  Abdomen: gravid, soft, non-tender       OBSTETRIC ULTRASOUND  The patient had a twin UA Doppler & BPP ultrasound today which I interpreted the results and reviewed them with the patient.   Ultrasound findings:   Twin A - IUP in cephalic presentation.    Placenta is posterior.   Cardiac activity is present, 143 bpm  EFW 2841 g ( 6 lb 4 oz); 22%.   MVP is 5.3 cm. BPP is 8/8.  UA Doppler 2.35.    TWIN A: The fetal measurements are consistent with established EDC. No gross ultrasound evidence of structural abnormalities are seen today. The patient understands that ultrasound cannot rule out all structural and chromosomal abnormalities.    Twin B - IUP in breech presentation.   Placenta is anterior.  Cardiac activity is present, 124 bpm  EFW 2728 g (6 lb 0 oz); 15%. AC 9%  MVP is 5.5 cm. BPP is 8/8.  UA Doppler 2.06 .    TWIN B: The fetal measurements are consistent with suspected fetal growth restriction. No gross ultrasound evidence of structural abnormalities are seen  today. The patient understands that ultrasound cannot rule out all structural and chromosomal abnormalities.    Discordance - 4 %       See imaging tab for the complete US report.     DISCUSSION  During her visit we discussed and reviewed the following issues:  We discussed maternal positioning exercises to help spontaneous conversion to cephalic presentations.  I explained that this has not been studied in twins but it was studied in desouza gestations and showed some benefit    DIAMNIOTIC DICHORIONIC TWIN GESTATION  The increased  morbidity and mortality associated with dichorionic twins was discussed previously and reviewed.  Reviewed the plan of care.  See MFM note from 11/15/2023 for detailed review.     Prevention of Preeclampsia  The role of low-dose aspirin for the rest of preeclampsia was discussed previously and reviewed.  See MFM note from 11/15/2023 for detailed review.     GESTATIONAL DIABETES - follow-up  ADA diet recommendations were reviewed and the patient's dietary compliance is good.  Her capillary blood glucose records were reviewed today; her compliance with the recommended four times daily assessments (fasting and two-hour post-prandial) is good.   Her overall glucose control is good.    Medical Regimen Recommendation: no change.  Continue management with diet    Continued medication use and capillary blood glucose assessments were reviewed as well as recommendations for serial growth ultrasounds and antepartum testing.    FETAL GROWTH RESTRICTION - TWIN B  Discussed previously and reviewed.  See MFM note from 2024 for detailed review.    Category  FGR (AC < 10%) with NORMAL UA Dopplers      Patient is scheduled for  on 3/18/24.  As she is at 37w6d with reassuring fetal testing today and AC of twin B is at 9%, this is reasonable to keep her  scheduled  for 3/18.  She really wants to go to as close to 39 weeks as she can.  I went over that dichorionic twins are  delivered at 38-38w6d because of the 3-5% risk of stillbirth that goes from the beginning of the pregnancy to the very end. We went over that anywhere in 38-38w6d is what we recommend for delivery of dichorionic twins.  Before she left, I again encouraged her to go with 3/18/24 as she had scheduled.    IMPRESSION:  IUP at 36w5d; cephalic/ cephalic  Dichorionic twin gestation  TWIN B - FGR (lagging AC noted at 34+ weeks); Breech;  normal UA Dopplers; reassuring  testing  Gestational diabetes, A1  Twin A - reassuring  testing; normal umbilical artery Doppler     RECOMMENDATIONS:  Continue care with Dr. Troncoso  Weekly NST's  Follow-up growth BPP and Dopplers if undelivered in 3 weeks (Next week)  Delivery at 37-38 weeks -in light of IUGR of twin B - patient hesitant to proceed with delivery earlier.  Patient is 37w6d today.  I recommended that she be delivered 38-38w6d and encouraged her to keep her  as scheduled on 3/18/24.  Consider Low dose aspirin  mg daily until 37 weeks  Continue four times daily capillary blood glucose assessments (fasting and 2 hour postprandial)  Weekly Maternal-Fetal Medicine review of capillary blood glucose values    Thank you for allowing me to participate in the care of this patient.  Please feel free to contact me with any questions.     Hortencia Velasco MD   Maternal-Fetal Medicine    30 minutes spent in review of records, patient consultation, documentation and coordination of care.  The relevant clinical matter(s) are summarized above.      Note to patient and family:  The  Century Cures Act makes medical notes available to patients in the interest of transparency.  However, please be advised that this is a medical document.  It is intended as a peer to peer communication.  It is written in medical language and may contain abbreviations or verbiage that are technical and unfamiliar.  It may appear blunt or direct.  Medical documents are intended to  carry relevant information, facts as evident, and the clinical opinion of the practitioner.

## 2024-03-14 ENCOUNTER — ROUTINE PRENATAL (OUTPATIENT)
Facility: CLINIC | Age: 29
End: 2024-03-14
Payer: COMMERCIAL

## 2024-03-14 ENCOUNTER — ANESTHESIA EVENT (OUTPATIENT)
Dept: OBGYN UNIT | Facility: HOSPITAL | Age: 29
End: 2024-03-14
Payer: COMMERCIAL

## 2024-03-14 VITALS
HEIGHT: 62 IN | BODY MASS INDEX: 32.57 KG/M2 | SYSTOLIC BLOOD PRESSURE: 120 MMHG | DIASTOLIC BLOOD PRESSURE: 72 MMHG | WEIGHT: 177 LBS | HEART RATE: 93 BPM

## 2024-03-14 DIAGNOSIS — Z34.03 ENCOUNTER FOR SUPERVISION OF NORMAL FIRST PREGNANCY IN THIRD TRIMESTER (HCC): Primary | ICD-10-CM

## 2024-03-14 DIAGNOSIS — O36.5932 POOR FETAL GROWTH AFFECTING MANAGEMENT OF MOTHER IN THIRD TRIMESTER, FETUS 2 OF MULTIPLE GESTATION (HCC): ICD-10-CM

## 2024-03-14 DIAGNOSIS — O30.043 DICHORIONIC DIAMNIOTIC TWIN PREGNANCY IN THIRD TRIMESTER (HCC): ICD-10-CM

## 2024-03-14 DIAGNOSIS — Z3A.38 38 WEEKS GESTATION OF PREGNANCY (HCC): ICD-10-CM

## 2024-03-14 DIAGNOSIS — O24.410 DIET CONTROLLED GESTATIONAL DIABETES MELLITUS (GDM) IN THIRD TRIMESTER (HCC): ICD-10-CM

## 2024-03-14 PROCEDURE — 3078F DIAST BP <80 MM HG: CPT | Performed by: OBSTETRICS & GYNECOLOGY

## 2024-03-14 PROCEDURE — 59025 FETAL NON-STRESS TEST: CPT | Performed by: OBSTETRICS & GYNECOLOGY

## 2024-03-14 PROCEDURE — 3074F SYST BP LT 130 MM HG: CPT | Performed by: OBSTETRICS & GYNECOLOGY

## 2024-03-14 PROCEDURE — 3008F BODY MASS INDEX DOCD: CPT | Performed by: OBSTETRICS & GYNECOLOGY
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
## 2024-03-15 ENCOUNTER — TELEPHONE (OUTPATIENT)
Facility: CLINIC | Age: 29
End: 2024-03-15

## 2024-03-18 ENCOUNTER — ROUTINE PRENATAL (OUTPATIENT)
Facility: CLINIC | Age: 29
End: 2024-03-18
Payer: MEDICAID

## 2024-03-18 ENCOUNTER — ANESTHESIA (OUTPATIENT)
Dept: OBGYN UNIT | Facility: HOSPITAL | Age: 29
End: 2024-03-18
Payer: COMMERCIAL

## 2024-03-18 VITALS
WEIGHT: 177.63 LBS | SYSTOLIC BLOOD PRESSURE: 122 MMHG | BODY MASS INDEX: 32.69 KG/M2 | DIASTOLIC BLOOD PRESSURE: 80 MMHG | HEIGHT: 62 IN | HEART RATE: 75 BPM

## 2024-03-18 DIAGNOSIS — O99.013 MATERNAL IRON DEFICIENCY ANEMIA AFFECTING PREGNANCY IN THIRD TRIMESTER, ANTEPARTUM (HCC): ICD-10-CM

## 2024-03-18 DIAGNOSIS — Z14.8 GENETIC CARRIER: ICD-10-CM

## 2024-03-18 DIAGNOSIS — O36.5932 POOR FETAL GROWTH AFFECTING MANAGEMENT OF MOTHER IN THIRD TRIMESTER, FETUS 2 OF MULTIPLE GESTATION (HCC): Primary | ICD-10-CM

## 2024-03-18 DIAGNOSIS — D50.9 MATERNAL IRON DEFICIENCY ANEMIA AFFECTING PREGNANCY IN THIRD TRIMESTER, ANTEPARTUM (HCC): ICD-10-CM

## 2024-03-18 DIAGNOSIS — Z67.91 RH NEGATIVE STATUS DURING PREGNANCY IN THIRD TRIMESTER (HCC): ICD-10-CM

## 2024-03-18 DIAGNOSIS — D56.3 ALPHA THALASSEMIA TRAIT: ICD-10-CM

## 2024-03-18 DIAGNOSIS — O30.043 DICHORIONIC DIAMNIOTIC TWIN PREGNANCY IN THIRD TRIMESTER (HCC): ICD-10-CM

## 2024-03-18 DIAGNOSIS — O26.893 RH NEGATIVE STATUS DURING PREGNANCY IN THIRD TRIMESTER (HCC): ICD-10-CM

## 2024-03-18 DIAGNOSIS — O24.410 DIET CONTROLLED GESTATIONAL DIABETES MELLITUS (GDM) IN THIRD TRIMESTER (HCC): ICD-10-CM

## 2024-03-18 NOTE — PATIENT INSTRUCTIONS
Pediatrics/Family Medicine (Doctor for baby)    Pediatrics - birth through 18 years of age  Family Medicine/Practice - birth through adulthood    Please select a pediatrician or family medicine provider BEFORE you are admitted to the hospital to give birth.     Make sure that provider accepts your insurance.   Pick a provider that is close to where you live.    If the provider is on staff at [facility], the nursing staff will notify them when your infant is born so they are aware to come to the hospital to examine the infant.     If the provider you have chosen is not on staff at [facility], a pediatric hospitalist will care for your infant during the hospitalization only. You must arrange the follow up visit with your provider.     After delivery at the hospital follow up visit instructions for baby will be provided prior to discharge.     The baby will not be able to leave the hospital without a follow up visit scheduled.     To establish care:  [email or web address]  Or   [facility] Physician Referral line at [phone]      There are MANY providers available. It would be impossible to list them all, but here are a few popular pediatrics groups in [place]:    [facility] [place] [phone]  [facility] [place] [phone]  [facility] [place] [phone]  [facility] [place] [phone]  [facility] [place] [phone]  [facility] [phone]    There are also many wonderful independent practitioners as well. We recommend you speak with family, friends, colleagues as personal recommendations can be very helpful.
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
## 2024-03-21 ENCOUNTER — ANESTHESIA (OUTPATIENT)
Dept: OBGYN UNIT | Facility: HOSPITAL | Age: 29
End: 2024-03-21
Payer: COMMERCIAL

## 2024-03-21 ENCOUNTER — ANESTHESIA EVENT (OUTPATIENT)
Dept: OBGYN UNIT | Facility: HOSPITAL | Age: 29
End: 2024-03-21
Payer: COMMERCIAL

## 2024-03-21 ENCOUNTER — HOSPITAL ENCOUNTER (INPATIENT)
Facility: HOSPITAL | Age: 29
LOS: 4 days | Discharge: HOME | End: 2024-03-25
Attending: OBSTETRICS & GYNECOLOGY | Admitting: OBSTETRICS & GYNECOLOGY
Payer: COMMERCIAL

## 2024-03-21 DIAGNOSIS — G89.18 POST-OP PAIN: Primary | ICD-10-CM

## 2024-03-21 PROBLEM — Z3A.39 39 WEEKS GESTATION OF PREGNANCY (HCC): Status: ACTIVE | Noted: 2024-03-21

## 2024-03-21 PROBLEM — O30.009 TWIN DELIVERY BY C-SECTION (HCC): Status: ACTIVE | Noted: 2024-03-21

## 2024-03-21 PROBLEM — Z98.890 STATUS POST DILATION AND CURETTAGE: Status: ACTIVE | Noted: 2024-03-21

## 2024-03-21 PROBLEM — Z98.891 STATUS POST PRIMARY LOW TRANSVERSE CESAREAN SECTION: Status: ACTIVE | Noted: 2024-03-21

## 2024-03-21 PROBLEM — O30.049 DICHORIONIC DIAMNIOTIC TWIN GESTATION (HCC): Status: ACTIVE | Noted: 2024-03-21

## 2024-03-21 PROBLEM — Z92.89 HISTORY OF TRANSFUSION OF PACKED RED BLOOD CELLS: Status: ACTIVE | Noted: 2024-03-21

## 2024-03-21 LAB
ALBUMIN SERPL-MCNC: 2.3 G/DL (ref 3.4–5)
ALBUMIN SERPL-MCNC: 2.4 G/DL (ref 3.4–5)
ALBUMIN SERPL-MCNC: 2.8 G/DL (ref 3.4–5)
ALBUMIN/GLOB SERPL: 0.7 {RATIO} (ref 1–2)
ALBUMIN/GLOB SERPL: 0.7 {RATIO} (ref 1–2)
ALBUMIN/GLOB SERPL: 0.8 {RATIO} (ref 1–2)
ALP LIVER SERPL-CCNC: 191 U/L
ALP LIVER SERPL-CCNC: 213 U/L
ALP LIVER SERPL-CCNC: 251 U/L
ALT SERPL-CCNC: 21 U/L
ALT SERPL-CCNC: 23 U/L
ALT SERPL-CCNC: 23 U/L
ANION GAP SERPL CALC-SCNC: 4 MMOL/L (ref 0–18)
ANION GAP SERPL CALC-SCNC: 6 MMOL/L (ref 0–18)
ANION GAP SERPL CALC-SCNC: 7 MMOL/L (ref 0–18)
ANTIBODY SCREEN: POSITIVE
APTT PPP: 26.7 SECONDS (ref 23.3–35.6)
APTT PPP: 31.3 SECONDS (ref 23.3–35.6)
AST SERPL-CCNC: 27 U/L (ref 15–37)
AST SERPL-CCNC: 30 U/L (ref 15–37)
AST SERPL-CCNC: 35 U/L (ref 15–37)
BASOPHILS # BLD AUTO: 0.02 X10(3) UL (ref 0–0.2)
BASOPHILS # BLD AUTO: 0.02 X10(3) UL (ref 0–0.2)
BASOPHILS # BLD AUTO: 0.03 X10(3) UL (ref 0–0.2)
BASOPHILS NFR BLD AUTO: 0.1 %
BASOPHILS NFR BLD AUTO: 0.3 %
BASOPHILS NFR BLD AUTO: 0.4 %
BILIRUB SERPL-MCNC: 0.4 MG/DL (ref 0.1–2)
BILIRUB SERPL-MCNC: 0.4 MG/DL (ref 0.1–2)
BILIRUB SERPL-MCNC: 0.5 MG/DL (ref 0.1–2)
BUN BLD-MCNC: 14 MG/DL (ref 9–23)
BUN BLD-MCNC: 15 MG/DL (ref 9–23)
BUN BLD-MCNC: 18 MG/DL (ref 9–23)
CALCIUM BLD-MCNC: 8.5 MG/DL (ref 8.5–10.1)
CALCIUM BLD-MCNC: 9 MG/DL (ref 8.5–10.1)
CALCIUM BLD-MCNC: 9.8 MG/DL (ref 8.5–10.1)
CHLORIDE SERPL-SCNC: 109 MMOL/L (ref 98–112)
CHLORIDE SERPL-SCNC: 110 MMOL/L (ref 98–112)
CHLORIDE SERPL-SCNC: 110 MMOL/L (ref 98–112)
CO2 SERPL-SCNC: 19 MMOL/L (ref 21–32)
CO2 SERPL-SCNC: 21 MMOL/L (ref 21–32)
CO2 SERPL-SCNC: 21 MMOL/L (ref 21–32)
CREAT BLD-MCNC: 0.72 MG/DL
CREAT BLD-MCNC: 0.78 MG/DL
CREAT BLD-MCNC: 0.87 MG/DL
CREAT UR-SCNC: 97.3 MG/DL
EGFRCR SERPLBLD CKD-EPI 2021: 105 ML/MIN/1.73M2 (ref 60–?)
EGFRCR SERPLBLD CKD-EPI 2021: 115 ML/MIN/1.73M2 (ref 60–?)
EGFRCR SERPLBLD CKD-EPI 2021: 92 ML/MIN/1.73M2 (ref 60–?)
EOSINOPHIL # BLD AUTO: 0 X10(3) UL (ref 0–0.7)
EOSINOPHIL # BLD AUTO: 0.02 X10(3) UL (ref 0–0.7)
EOSINOPHIL # BLD AUTO: 0.02 X10(3) UL (ref 0–0.7)
EOSINOPHIL NFR BLD AUTO: 0 %
EOSINOPHIL NFR BLD AUTO: 0.2 %
EOSINOPHIL NFR BLD AUTO: 0.4 %
ERYTHROCYTE [DISTWIDTH] IN BLOOD BY AUTOMATED COUNT: 14.6 %
ERYTHROCYTE [DISTWIDTH] IN BLOOD BY AUTOMATED COUNT: 14.7 %
ERYTHROCYTE [DISTWIDTH] IN BLOOD BY AUTOMATED COUNT: 14.7 %
FIBRINOGEN PPP-MCNC: 319 MG/DL (ref 180–480)
FIBRINOGEN PPP-MCNC: 427 MG/DL (ref 180–480)
GLOBULIN PLAS-MCNC: 3.3 G/DL (ref 2.8–4.4)
GLOBULIN PLAS-MCNC: 3.5 G/DL (ref 2.8–4.4)
GLOBULIN PLAS-MCNC: 3.5 G/DL (ref 2.8–4.4)
GLUCOSE BLD-MCNC: 116 MG/DL (ref 70–99)
GLUCOSE BLD-MCNC: 75 MG/DL (ref 70–99)
GLUCOSE BLD-MCNC: 81 MG/DL (ref 70–99)
GLUCOSE BLD-MCNC: 90 MG/DL (ref 70–99)
HCT VFR BLD AUTO: 32.9 %
HCT VFR BLD AUTO: 37.9 %
HCT VFR BLD AUTO: 38.7 %
HGB BLD-MCNC: 10.9 G/DL
HGB BLD-MCNC: 12.6 G/DL
HGB BLD-MCNC: 12.7 G/DL
IMM GRANULOCYTES # BLD AUTO: 0.05 X10(3) UL (ref 0–1)
IMM GRANULOCYTES # BLD AUTO: 0.09 X10(3) UL (ref 0–1)
IMM GRANULOCYTES # BLD AUTO: 0.13 X10(3) UL (ref 0–1)
IMM GRANULOCYTES NFR BLD: 0.8 %
IMM GRANULOCYTES NFR BLD: 0.9 %
IMM GRANULOCYTES NFR BLD: 1 %
INR BLD: 1.01 (ref 0.8–1.2)
INR BLD: 1.06 (ref 0.8–1.2)
LYMPHOCYTES # BLD AUTO: 0.52 X10(3) UL (ref 1–4)
LYMPHOCYTES # BLD AUTO: 0.95 X10(3) UL (ref 1–4)
LYMPHOCYTES # BLD AUTO: 1.34 X10(3) UL (ref 1–4)
LYMPHOCYTES NFR BLD AUTO: 13.3 %
LYMPHOCYTES NFR BLD AUTO: 18.3 %
LYMPHOCYTES NFR BLD AUTO: 3.1 %
MCH RBC QN AUTO: 27.7 PG (ref 26–34)
MCH RBC QN AUTO: 27.7 PG (ref 26–34)
MCH RBC QN AUTO: 28.1 PG (ref 26–34)
MCHC RBC AUTO-ENTMCNC: 32.8 G/DL (ref 31–37)
MCHC RBC AUTO-ENTMCNC: 33.1 G/DL (ref 31–37)
MCHC RBC AUTO-ENTMCNC: 33.2 G/DL (ref 31–37)
MCV RBC AUTO: 83.3 FL
MCV RBC AUTO: 84.5 FL
MCV RBC AUTO: 84.8 FL
MONOCYTES # BLD AUTO: 0.27 X10(3) UL (ref 0.1–1)
MONOCYTES # BLD AUTO: 0.31 X10(3) UL (ref 0.1–1)
MONOCYTES # BLD AUTO: 0.35 X10(3) UL (ref 0.1–1)
MONOCYTES NFR BLD AUTO: 1.8 %
MONOCYTES NFR BLD AUTO: 2.7 %
MONOCYTES NFR BLD AUTO: 6.7 %
NEUTROPHILS # BLD AUTO: 15.88 X10 (3) UL (ref 1.5–7.7)
NEUTROPHILS # BLD AUTO: 15.88 X10(3) UL (ref 1.5–7.7)
NEUTROPHILS # BLD AUTO: 3.8 X10 (3) UL (ref 1.5–7.7)
NEUTROPHILS # BLD AUTO: 3.8 X10(3) UL (ref 1.5–7.7)
NEUTROPHILS # BLD AUTO: 8.33 X10 (3) UL (ref 1.5–7.7)
NEUTROPHILS # BLD AUTO: 8.33 X10(3) UL (ref 1.5–7.7)
NEUTROPHILS NFR BLD AUTO: 73.2 %
NEUTROPHILS NFR BLD AUTO: 82.6 %
NEUTROPHILS NFR BLD AUTO: 94.2 %
OSMOLALITY SERPL CALC.SUM OF ELEC: 282 MOSM/KG (ref 275–295)
OSMOLALITY SERPL CALC.SUM OF ELEC: 282 MOSM/KG (ref 275–295)
OSMOLALITY SERPL CALC.SUM OF ELEC: 283 MOSM/KG (ref 275–295)
PLATELET # BLD AUTO: 161 10(3)UL (ref 150–450)
PLATELET # BLD AUTO: 167 10(3)UL (ref 150–450)
PLATELET # BLD AUTO: 168 10(3)UL (ref 150–450)
PLATELETS.RETICULATED NFR BLD AUTO: 13.4 % (ref 0–7)
PLATELETS.RETICULATED NFR BLD AUTO: 14.5 % (ref 0–7)
POTASSIUM SERPL-SCNC: 4.3 MMOL/L (ref 3.5–5.1)
POTASSIUM SERPL-SCNC: 4.6 MMOL/L (ref 3.5–5.1)
POTASSIUM SERPL-SCNC: 5.2 MMOL/L (ref 3.5–5.1)
PROT SERPL-MCNC: 5.6 G/DL (ref 6.4–8.2)
PROT SERPL-MCNC: 5.9 G/DL (ref 6.4–8.2)
PROT SERPL-MCNC: 6.3 G/DL (ref 6.4–8.2)
PROT UR-MCNC: 111.8 MG/DL
PROT/CREAT UR-RTO: 1.15
PROTHROMBIN TIME: 13.3 SECONDS (ref 11.6–14.8)
PROTHROMBIN TIME: 13.8 SECONDS (ref 11.6–14.8)
RBC # BLD AUTO: 3.88 X10(6)UL
RBC # BLD AUTO: 4.55 X10(6)UL
RBC # BLD AUTO: 4.58 X10(6)UL
RH BLOOD TYPE: NEGATIVE
SODIUM SERPL-SCNC: 135 MMOL/L (ref 136–145)
SODIUM SERPL-SCNC: 136 MMOL/L (ref 136–145)
SODIUM SERPL-SCNC: 136 MMOL/L (ref 136–145)
T PALLIDUM AB SER QL IA: REACTIVE
URATE SERPL-MCNC: 6.3 MG/DL
WBC # BLD AUTO: 10.1 X10(3) UL (ref 4–11)
WBC # BLD AUTO: 16.9 X10(3) UL (ref 4–11)
WBC # BLD AUTO: 5.2 X10(3) UL (ref 4–11)

## 2024-03-21 PROCEDURE — 30233N1 TRANSFUSION OF NONAUTOLOGOUS RED BLOOD CELLS INTO PERIPHERAL VEIN, PERCUTANEOUS APPROACH: ICD-10-PCS | Performed by: OBSTETRICS & GYNECOLOGY

## 2024-03-21 PROCEDURE — 59515 CESAREAN DELIVERY: CPT | Performed by: OBSTETRICS & GYNECOLOGY

## 2024-03-21 PROCEDURE — 0W3R7ZZ CONTROL BLEEDING IN GENITOURINARY TRACT, VIA NATURAL OR ARTIFICIAL OPENING: ICD-10-PCS | Performed by: OBSTETRICS & GYNECOLOGY

## 2024-03-21 PROCEDURE — 10D17ZZ EXTRACTION OF PRODUCTS OF CONCEPTION, RETAINED, VIA NATURAL OR ARTIFICIAL OPENING: ICD-10-PCS | Performed by: OBSTETRICS & GYNECOLOGY

## 2024-03-21 RX ORDER — NICOTINE POLACRILEX 4 MG
15 LOZENGE BUCCAL
Status: DISCONTINUED | OUTPATIENT
Start: 2024-03-21 | End: 2024-03-22 | Stop reason: HOSPADM

## 2024-03-21 RX ORDER — SODIUM CHLORIDE 9 MG/ML
INJECTION, SOLUTION INTRAVENOUS ONCE
Status: COMPLETED | OUTPATIENT
Start: 2024-03-21 | End: 2024-03-22

## 2024-03-21 RX ORDER — OXYCODONE HYDROCHLORIDE 5 MG/1
5 TABLET ORAL EVERY 6 HOURS PRN
Status: DISCONTINUED | OUTPATIENT
Start: 2024-03-21 | End: 2024-03-23

## 2024-03-21 RX ORDER — IBUPROFEN 600 MG/1
600 TABLET ORAL EVERY 6 HOURS
Status: DISCONTINUED | OUTPATIENT
Start: 2024-03-22 | End: 2024-03-25

## 2024-03-21 RX ORDER — BISACODYL 10 MG
10 SUPPOSITORY, RECTAL RECTAL ONCE AS NEEDED
Status: DISCONTINUED | OUTPATIENT
Start: 2024-03-21 | End: 2024-03-25

## 2024-03-21 RX ORDER — METOCLOPRAMIDE HYDROCHLORIDE 5 MG/ML
INJECTION INTRAMUSCULAR; INTRAVENOUS AS NEEDED
Status: DISCONTINUED | OUTPATIENT
Start: 2024-03-21 | End: 2024-03-21 | Stop reason: SURG

## 2024-03-21 RX ORDER — CLINDAMYCIN PHOSPHATE 900 MG/50ML
900 INJECTION, SOLUTION INTRAVENOUS ONCE
Status: COMPLETED | OUTPATIENT
Start: 2024-03-21 | End: 2024-03-21

## 2024-03-21 RX ORDER — ENOXAPARIN SODIUM 100 MG/ML
40 INJECTION SUBCUTANEOUS DAILY
Status: DISCONTINUED | OUTPATIENT
Start: 2024-03-21 | End: 2024-03-21 | Stop reason: SDUPTHER

## 2024-03-21 RX ORDER — ONDANSETRON 2 MG/ML
4 INJECTION INTRAMUSCULAR; INTRAVENOUS EVERY 6 HOURS PRN
Status: DISCONTINUED | OUTPATIENT
Start: 2024-03-21 | End: 2024-03-25

## 2024-03-21 RX ORDER — METHYLERGONOVINE MALEATE 0.2 MG/ML
0.2 INJECTION INTRAVENOUS ONCE
Status: COMPLETED | OUTPATIENT
Start: 2024-03-21 | End: 2024-03-21

## 2024-03-21 RX ORDER — TRANEXAMIC ACID 10 MG/ML
INJECTION, SOLUTION INTRAVENOUS
Status: COMPLETED
Start: 2024-03-21 | End: 2024-03-21

## 2024-03-21 RX ORDER — ACETAMINOPHEN 500 MG
1000 TABLET ORAL ONCE
Status: COMPLETED | OUTPATIENT
Start: 2024-03-21 | End: 2024-03-21

## 2024-03-21 RX ORDER — ENOXAPARIN SODIUM 100 MG/ML
40 INJECTION SUBCUTANEOUS DAILY
Status: DISCONTINUED | OUTPATIENT
Start: 2024-03-22 | End: 2024-03-22

## 2024-03-21 RX ORDER — PHENYLEPHRINE HCL 10 MG/ML
VIAL (ML) INJECTION AS NEEDED
Status: DISCONTINUED | OUTPATIENT
Start: 2024-03-21 | End: 2024-03-21 | Stop reason: SURG

## 2024-03-21 RX ORDER — LIDOCAINE HYDROCHLORIDE 10 MG/ML
INJECTION, SOLUTION EPIDURAL; INFILTRATION; INTRACAUDAL; PERINEURAL AS NEEDED
Status: DISCONTINUED | OUTPATIENT
Start: 2024-03-21 | End: 2024-03-21 | Stop reason: SURG

## 2024-03-21 RX ORDER — MISOPROSTOL 200 UG/1
1000 TABLET ORAL ONCE
Status: COMPLETED | OUTPATIENT
Start: 2024-03-21 | End: 2024-03-21

## 2024-03-21 RX ORDER — CLINDAMYCIN PHOSPHATE 900 MG/50ML
INJECTION, SOLUTION INTRAVENOUS
Status: DISPENSED
Start: 2024-03-21 | End: 2024-03-22

## 2024-03-21 RX ORDER — MORPHINE SULFATE 2 MG/ML
INJECTION, SOLUTION INTRAMUSCULAR; INTRAVENOUS AS NEEDED
Status: DISCONTINUED | OUTPATIENT
Start: 2024-03-21 | End: 2024-03-21 | Stop reason: SURG

## 2024-03-21 RX ORDER — KETOROLAC TROMETHAMINE 30 MG/ML
30 INJECTION, SOLUTION INTRAMUSCULAR; INTRAVENOUS ONCE
Status: COMPLETED | OUTPATIENT
Start: 2024-03-21 | End: 2024-03-21

## 2024-03-21 RX ORDER — CLINDAMYCIN PHOSPHATE 600 MG/50ML
600 INJECTION, SOLUTION INTRAVENOUS ONCE
Status: COMPLETED | OUTPATIENT
Start: 2024-03-21 | End: 2024-03-22

## 2024-03-21 RX ORDER — SODIUM CHLORIDE, SODIUM LACTATE, POTASSIUM CHLORIDE, CALCIUM CHLORIDE 600; 310; 30; 20 MG/100ML; MG/100ML; MG/100ML; MG/100ML
125 INJECTION, SOLUTION INTRAVENOUS CONTINUOUS
Status: DISCONTINUED | OUTPATIENT
Start: 2024-03-21 | End: 2024-03-22 | Stop reason: HOSPADM

## 2024-03-21 RX ORDER — CLINDAMYCIN PHOSPHATE 900 MG/50ML
INJECTION, SOLUTION INTRAVENOUS AS NEEDED
Status: DISCONTINUED | OUTPATIENT
Start: 2024-03-21 | End: 2024-03-21 | Stop reason: SURG

## 2024-03-21 RX ORDER — MIDAZOLAM HYDROCHLORIDE 1 MG/ML
INJECTION INTRAMUSCULAR; INTRAVENOUS AS NEEDED
Status: DISCONTINUED | OUTPATIENT
Start: 2024-03-21 | End: 2024-03-21 | Stop reason: SURG

## 2024-03-21 RX ORDER — EPHEDRINE SULFATE 50 MG/ML
INJECTION INTRAVENOUS AS NEEDED
Status: DISCONTINUED | OUTPATIENT
Start: 2024-03-21 | End: 2024-03-21 | Stop reason: SURG

## 2024-03-21 RX ORDER — ONDANSETRON 2 MG/ML
INJECTION INTRAMUSCULAR; INTRAVENOUS AS NEEDED
Status: DISCONTINUED | OUTPATIENT
Start: 2024-03-21 | End: 2024-03-21 | Stop reason: SURG

## 2024-03-21 RX ORDER — KETOROLAC TROMETHAMINE 30 MG/ML
30 INJECTION, SOLUTION INTRAMUSCULAR; INTRAVENOUS EVERY 6 HOURS
Status: DISPENSED | OUTPATIENT
Start: 2024-03-21 | End: 2024-03-22

## 2024-03-21 RX ORDER — NICOTINE POLACRILEX 4 MG
30 LOZENGE BUCCAL
Status: DISCONTINUED | OUTPATIENT
Start: 2024-03-21 | End: 2024-03-22 | Stop reason: HOSPADM

## 2024-03-21 RX ORDER — ONDANSETRON 2 MG/ML
4 INJECTION INTRAMUSCULAR; INTRAVENOUS EVERY 6 HOURS PRN
Status: DISCONTINUED | OUTPATIENT
Start: 2024-03-21 | End: 2024-03-21

## 2024-03-21 RX ORDER — BUPIVACAINE HYDROCHLORIDE 7.5 MG/ML
INJECTION, SOLUTION INTRASPINAL AS NEEDED
Status: DISCONTINUED | OUTPATIENT
Start: 2024-03-21 | End: 2024-03-21 | Stop reason: SURG

## 2024-03-21 RX ORDER — SODIUM CHLORIDE 9 MG/ML
100 INJECTION, SOLUTION INTRAVENOUS ONCE
Status: DISCONTINUED | OUTPATIENT
Start: 2024-03-21 | End: 2024-03-22 | Stop reason: HOSPADM

## 2024-03-21 RX ORDER — ACETAMINOPHEN 500 MG
1000 TABLET ORAL EVERY 6 HOURS
Status: DISCONTINUED | OUTPATIENT
Start: 2024-03-21 | End: 2024-03-25

## 2024-03-21 RX ORDER — DEXAMETHASONE SODIUM PHOSPHATE 4 MG/ML
VIAL (ML) INJECTION AS NEEDED
Status: DISCONTINUED | OUTPATIENT
Start: 2024-03-21 | End: 2024-03-21 | Stop reason: SURG

## 2024-03-21 RX ORDER — DEXTROSE, SODIUM CHLORIDE, SODIUM LACTATE, POTASSIUM CHLORIDE, AND CALCIUM CHLORIDE 5; .6; .31; .03; .02 G/100ML; G/100ML; G/100ML; G/100ML; G/100ML
INJECTION, SOLUTION INTRAVENOUS CONTINUOUS PRN
Status: DISCONTINUED | OUTPATIENT
Start: 2024-03-21 | End: 2024-03-25

## 2024-03-21 RX ORDER — CLINDAMYCIN PHOSPHATE 600 MG/50ML
600 INJECTION, SOLUTION INTRAVENOUS EVERY 8 HOURS
Status: DISCONTINUED | OUTPATIENT
Start: 2024-03-22 | End: 2024-03-22

## 2024-03-21 RX ORDER — GENTAMICIN SULFATE 40 MG/ML
5 INJECTION, SOLUTION INTRAMUSCULAR; INTRAVENOUS ONCE
Status: COMPLETED | OUTPATIENT
Start: 2024-03-21 | End: 2024-03-21

## 2024-03-21 RX ORDER — DEXTROSE MONOHYDRATE 25 G/50ML
50 INJECTION, SOLUTION INTRAVENOUS
Status: DISCONTINUED | OUTPATIENT
Start: 2024-03-21 | End: 2024-03-22 | Stop reason: HOSPADM

## 2024-03-21 RX ORDER — TRANEXAMIC ACID 10 MG/ML
1000 INJECTION, SOLUTION INTRAVENOUS ONCE
Status: DISCONTINUED | OUTPATIENT
Start: 2024-03-21 | End: 2024-03-25

## 2024-03-21 RX ORDER — DOCUSATE SODIUM 100 MG/1
100 CAPSULE, LIQUID FILLED ORAL
Status: DISCONTINUED | OUTPATIENT
Start: 2024-03-21 | End: 2024-03-25

## 2024-03-21 RX ORDER — ONDANSETRON 2 MG/ML
4 INJECTION INTRAMUSCULAR; INTRAVENOUS ONCE AS NEEDED
Status: ACTIVE | OUTPATIENT
Start: 2024-03-21 | End: 2024-03-21

## 2024-03-21 RX ORDER — SIMETHICONE 80 MG
80 TABLET,CHEWABLE ORAL 3 TIMES DAILY PRN
Status: DISCONTINUED | OUTPATIENT
Start: 2024-03-21 | End: 2024-03-25

## 2024-03-21 RX ORDER — NALBUPHINE HYDROCHLORIDE 10 MG/ML
2.5 INJECTION, SOLUTION INTRAMUSCULAR; INTRAVENOUS; SUBCUTANEOUS
Status: DISCONTINUED | OUTPATIENT
Start: 2024-03-21 | End: 2024-03-22 | Stop reason: HOSPADM

## 2024-03-21 RX ORDER — GABAPENTIN 300 MG/1
300 CAPSULE ORAL EVERY 8 HOURS PRN
Status: DISCONTINUED | OUTPATIENT
Start: 2024-03-21 | End: 2024-03-25

## 2024-03-21 RX ORDER — CITRIC ACID/SODIUM CITRATE 334-500MG
30 SOLUTION, ORAL ORAL ONCE
Status: COMPLETED | OUTPATIENT
Start: 2024-03-21 | End: 2024-03-21

## 2024-03-21 RX ORDER — HYDROMORPHONE HYDROCHLORIDE 1 MG/ML
0.3 INJECTION, SOLUTION INTRAMUSCULAR; INTRAVENOUS; SUBCUTANEOUS EVERY 2 HOUR PRN
Status: DISCONTINUED | OUTPATIENT
Start: 2024-03-21 | End: 2024-03-25

## 2024-03-21 RX ORDER — TRANEXAMIC ACID 10 MG/ML
1000 INJECTION, SOLUTION INTRAVENOUS ONCE
Status: COMPLETED | OUTPATIENT
Start: 2024-03-21 | End: 2024-03-21

## 2024-03-21 RX ORDER — SODIUM CHLORIDE, SODIUM LACTATE, POTASSIUM CHLORIDE, CALCIUM CHLORIDE 600; 310; 30; 20 MG/100ML; MG/100ML; MG/100ML; MG/100ML
INJECTION, SOLUTION INTRAVENOUS CONTINUOUS
Status: DISCONTINUED | OUTPATIENT
Start: 2024-03-21 | End: 2024-03-22

## 2024-03-21 RX ADMIN — PHENYLEPHRINE HCL 100 MCG: 10 MG/ML VIAL (ML) INJECTION at 13:47:00

## 2024-03-21 RX ADMIN — PHENYLEPHRINE HCL 100 MCG: 10 MG/ML VIAL (ML) INJECTION at 16:10:00

## 2024-03-21 RX ADMIN — DEXAMETHASONE SODIUM PHOSPHATE 4 MG: 4 MG/ML VIAL (ML) INJECTION at 13:47:00

## 2024-03-21 RX ADMIN — EPHEDRINE SULFATE 5 MG: 50 INJECTION INTRAVENOUS at 16:40:00

## 2024-03-21 RX ADMIN — PHENYLEPHRINE HCL 100 MCG: 10 MG/ML VIAL (ML) INJECTION at 16:15:00

## 2024-03-21 RX ADMIN — METOCLOPRAMIDE HYDROCHLORIDE 10 MG: 5 INJECTION INTRAMUSCULAR; INTRAVENOUS at 13:47:00

## 2024-03-21 RX ADMIN — MORPHINE SULFATE 0.2 MG: 2 INJECTION, SOLUTION INTRAMUSCULAR; INTRAVENOUS at 13:47:00

## 2024-03-21 RX ADMIN — TRANEXAMIC ACID 1000 MG: 10 INJECTION, SOLUTION INTRAVENOUS at 14:10:00

## 2024-03-21 RX ADMIN — LIDOCAINE HYDROCHLORIDE 50 MG: 10 INJECTION, SOLUTION EPIDURAL; INFILTRATION; INTRACAUDAL; PERINEURAL at 15:55:00

## 2024-03-21 RX ADMIN — EPHEDRINE SULFATE 5 MG: 50 INJECTION INTRAVENOUS at 16:35:00

## 2024-03-21 RX ADMIN — PHENYLEPHRINE HCL 100 MCG: 10 MG/ML VIAL (ML) INJECTION at 16:00:00

## 2024-03-21 RX ADMIN — PHENYLEPHRINE HCL 100 MCG: 10 MG/ML VIAL (ML) INJECTION at 16:05:00

## 2024-03-21 RX ADMIN — CLINDAMYCIN PHOSPHATE 900 MG: 900 INJECTION, SOLUTION INTRAVENOUS at 16:00:00

## 2024-03-21 RX ADMIN — ONDANSETRON 4 MG: 2 INJECTION INTRAMUSCULAR; INTRAVENOUS at 13:47:00

## 2024-03-21 RX ADMIN — LIDOCAINE HYDROCHLORIDE 3 ML: 10 INJECTION, SOLUTION EPIDURAL; INFILTRATION; INTRACAUDAL; PERINEURAL at 13:43:00

## 2024-03-21 RX ADMIN — BUPIVACAINE HYDROCHLORIDE 1.6 ML: 7.5 INJECTION, SOLUTION INTRASPINAL at 13:47:00

## 2024-03-21 RX ADMIN — MIDAZOLAM HYDROCHLORIDE 2 MG: 1 INJECTION INTRAMUSCULAR; INTRAVENOUS at 15:55:00

## 2024-03-21 RX ADMIN — ONDANSETRON 4 MG: 2 INJECTION INTRAMUSCULAR; INTRAVENOUS at 16:00:00

## 2024-03-21 RX ADMIN — SODIUM CHLORIDE, SODIUM LACTATE, POTASSIUM CHLORIDE, CALCIUM CHLORIDE: 600; 310; 30; 20 INJECTION, SOLUTION INTRAVENOUS at 15:50:00

## 2024-03-21 RX ADMIN — SODIUM CHLORIDE, SODIUM LACTATE, POTASSIUM CHLORIDE, CALCIUM CHLORIDE: 600; 310; 30; 20 INJECTION, SOLUTION INTRAVENOUS at 13:40:00

## 2024-03-21 RX ADMIN — METHYLERGONOVINE MALEATE 0.2 MG: 0.2 INJECTION INTRAVENOUS at 14:20:00

## 2024-03-21 NOTE — ANESTHESIA POSTPROCEDURE EVALUATION
[facility]    Marielena Leroy Patient Status:  Inpatient   Age/Gender 28 year old female MRN [id number]   Location [facility] LABOR & DELIVERY Attending Nguyen Webber MD   Hosp Day # 0 PCP No primary care provider on file.       Anesthesia Post-op Note     SECTION    Procedure Summary       Date: 24 Room / Location:  L+D OR   L+D OR    Anesthesia Start: 1340 Anesthesia Stop: 1502    Procedure:  SECTION Diagnosis:     Surgeons: Nguyen Webber MD Anesthesiologist: Mina Mullins MD    Anesthesia Type: spinal ASA Status: 2            Anesthesia Type: spinal    Vitals Value Taken Time   /77 24 1505   Temp 97.8 °F (36.6 °C) 24 1505   Pulse 56 24 1506   Resp 17 24 1506   SpO2 98 % 24 1506   Vitals shown include unfiled device data.    Patient Location: Labor and Delivery    Anesthesia Type: spinal    Airway Patency: patent    Postop Pain Control: adequate    Mental Status: preanesthetic baseline    Nausea/Vomiting: none    Cardiopulmonary/Hydration status: stable euvolemic    Complications: no apparent anesthesia related complications    Postop vital signs: stable    Dental Exam: Unchanged from Preop    Patient to be discharged from PACU when criteria met.

## 2024-03-21 NOTE — ANESTHESIA PREPROCEDURE EVALUATION
PRE-OP EVALUATION    Patient Name: Marielena Leroy    Admit Diagnosis: Dichorionic diamniotic twin gestation (HCC)    Pre-op Diagnosis: * No pre-op diagnosis entered *    DILATION AND CURETTAGE    Anesthesia Procedure: DILATION AND CURETTAGE (Bilateral: Uterus)    Surgeon(s) and Role:     * Nguyen Webber MD - Primary    Pre-op vitals reviewed.  Temp: 97.8 °F (36.6 °C)  Pulse: 53  Resp: 12  BP: 105/72     Body mass index is 32.37 kg/m².    Current medications reviewed.  Hospital Medications:   [COMPLETED] lactated ringers IV bolus 1,000 mL  1,000 mL Intravenous Once    Followed by    lactated ringers infusion  125 mL/hr Intravenous Continuous    [COMPLETED] acetaminophen (Tylenol Extra Strength) tab 1,000 mg  1,000 mg Oral Once    ondansetron (Zofran) 4 MG/2ML injection 4 mg  4 mg Intravenous Q6H PRN    [COMPLETED] sodium citrate-citric acid (Bicitra) 500-334 MG/5ML oral solution 30 mL  30 mL Oral Once    oxyTOCIN in sodium chloride 0.9% (Pitocin) 30 Units/500mL infusion premix  62.5-900 krystal-units/min Intravenous Continuous    [COMPLETED] gentamicin sulfate (Garamycin) 40 MG/ML injection 400 mg  5 mg/kg Intravenous Once    And    sodium chloride 0.9% infusion 100 mL  100 mL Intravenous Once    And    [COMPLETED] clindamycin phosphate in NaCl 0.9% (Cleocin) 900 mg/50mL IVPB premix 900 mg  900 mg Intravenous Once    [COMPLETED] tranexamic acid in sodium chloride 0.7% (Cyklokapron) 1000 mg/100mL infusion premix 1,000 mg  1,000 mg Intravenous Once    [COMPLETED] miSOPROStol (Cytotec) tab 1,000 mcg  1,000 mcg Rectal Once    [COMPLETED] methylergonovine (Methergine) 0.2 mg/mL injection 0.2 mg  0.2 mg Intramuscular Once    glucose (Dex4) 15 GM/59ML oral liquid 15 g  15 g Oral Q15 Min PRN    Or    glucose (Glutose) 40% oral gel 15 g  15 g Oral Q15 Min PRN    Or    glucose-vitamin C (Dex-4) chewable tab 4 tablet  4 tablet Oral Q15 Min PRN    Or    dextrose 50% injection 50 mL  50 mL Intravenous Q15 Min PRN    Or     glucose (Dex4) 15 GM/59ML oral liquid 30 g  30 g Oral Q15 Min PRN    Or    glucose (Glutose) 40% oral gel 30 g  30 g Oral Q15 Min PRN    Or    glucose-vitamin C (Dex-4) chewable tab 8 tablet  8 tablet Oral Q15 Min PRN    lactated ringers infusion   Intravenous Continuous    ketorolac (Toradol) 30 MG/ML injection 30 mg  30 mg Intravenous Once    ondansetron (Zofran) 4 MG/2ML injection 4 mg  4 mg Intravenous Once PRN    nalbuphine (Nubain) 10 mg/mL injection 2.5 mg  2.5 mg Intravenous Q15 Min PRN    oxyTOCIN in sodium chloride 0.9% (Pitocin) 30 Units/500mL infusion premix  62.5-900 krystal-units/min Intravenous Continuous    sodium chloride 0.9% infusion   Intravenous Once    clindamycin phosphate in dextrose 5% (Cleocin) 600 mg/50mL IVPB premix 600 mg  600 mg Intravenous Once    clindamycin phosphate in NaCl 0.9% (Cleocin) 900 mg/50mL IVPB premix           Outpatient Medications:     Facility-Administered Medications Prior to Admission   Medication Dose Route Frequency Provider Last Rate Last Admin    [COMPLETED] Rho D immune globulin (RhoGAM) IM injection 300 mcg  300 mcg Intramuscular Once Nguyen Webber MD   300 mcg at 24 0906     Medications Prior to Admission   Medication Sig Dispense Refill Last Dose    prenatal vitamin with DHA 27-0.8-228 MG Oral Cap Take 1 capsule by mouth daily.   3/20/2024    pantoprazole (PROTONIX) 40 MG Oral Tab EC Take 1 tablet (40 mg total) by mouth daily. (Patient not taking: Reported on 2024) 30 tablet 11     [] teraconazole 0.4 % Vaginal Cream Place 1 applicator vaginally nightly for 7 days. 45 g 0     [] teraconazole 0.4 % Vaginal Cream Place 1 applicator vaginally nightly for 7 days. (Patient not taking: Reported on 2023) 45 g 0        Allergies: Amoxicillin, Ampicillin, Penicillin g, and Penicillins      Anesthesia Evaluation    Patient summary reviewed.    Anesthetic Complications  (-) history of anesthetic complications          GI/Hepatic/Renal    Negative GI/hepatic/renal ROS.                             Cardiovascular    Negative cardiovascular ROS.    Exercise tolerance: good     MET: >4                                           Endo/Other      (+) diabetes and well controlled, gestational, not using insulin                         Pulmonary    Negative pulmonary ROS.                       Neuro/Psych    Negative neuro/psych ROS.                                  Past Surgical History:   Procedure Laterality Date      2024    Scheduled PLTCS for di-di twins with twin B breech. Dr. Nguyen Webber, [facility]    WISDOM TEETH REMOVED       Social History     Socioeconomic History    Marital status: Single   Tobacco Use    Smoking status: Every Day    Smokeless tobacco: Never   Vaping Use    Vaping Use: Never used   Substance and Sexual Activity    Alcohol use: Not Currently     Comment: soc.    Drug use: Never    Sexual activity: Yes     Partners: Male     History   Drug Use Unknown     Available pre-op labs reviewed.  Lab Results   Component Value Date    WBC 5.2 2024    RBC 4.55 2024    HGB 12.6 2024    HCT 37.9 2024    MCV 83.3 2024    MCH 27.7 2024    MCHC 33.2 2024    RDW 14.7 2024    .0 2024     Lab Results   Component Value Date     2024    K 4.3 2024     2024    CO2 19.0 (L) 2024    BUN 15 2024    CREATSERUM 0.72 2024    GLU 75 2024    CA 9.8 2024            Airway      Mallampati: II  Mouth opening: >3 FB  TM distance: > 6 cm  Neck ROM: full Cardiovascular    Cardiovascular exam normal.  Rhythm: regular  Rate: normal     Dental    Dentition appears grossly intact         Pulmonary    Pulmonary exam normal.  Breath sounds clear to auscultation bilaterally.               Other findings              ASA: 2 and emergent  Plan: MAC  NPO status verified and patient meets guidelines.  Patient has  not taken beta blockers in last 24 hours.  Post-procedure pain management plan discussed with surgeon and patient.      Plan/risks discussed with: patient and spouse  Use of blood product(s) discussed with: patient    Consented to blood products.          Present on Admission:   Fetal growth restriction twin B   Diet controlled gestational diabetes mellitus (GDM) in third trimester ([facility])   Dichorionic diamniotic twin pregnancy in third trimester ([facility])   Breech presentation, antepartum, fetus 2 ([facility])   Dichorionic diamniotic twin gestation ([facility])   39 weeks gestation of pregnancy ([facility])

## 2024-03-21 NOTE — PROGRESS NOTES
Patient is a G1 with Di/Di twin pregnancy @39.0 weeks who presents for a scheduled primary C/S for breech presentation.     Patient taken to triage 2.  Admission, labs and IV done.  POC discussed.  All patient questions answered at this time.

## 2024-03-21 NOTE — L&D DELIVERY NOTE
Rad, Girl  1 [[id number]]      Labor Events     labor?: No   steroids?: None  Antibiotics received during labor?: Yes  Antibiotics (enter # doses in comment): clindamycin, gentamicin (Comment: pre-op antibiotics sent to OR)  Rupture type: AROM  Fluid color: Clear  Intrapartum & labor complications: Other - see comments  Intrapartum & labor complications comment: Di-di twins, fetal growth restriction twin B, breech presentation twin B, diet controlled gestational diabetes       Labor Length    3rd stage: 0h 03m       [place] Presentation    Presentation: Vertex  Position: Left Occiput Anterior       Operative Delivery    Operative Vaginal Delivery: No                Shoulder Dystocia    Shoulder Dystocia: No       Anesthesia    Method: Spinal              [place] Delivery      Delivery date/time:  3/21/24 14:07:00   Delivery type: Caesarean Section    Details:   categorization: Primary    priority: scheduled   Indications for : Multiple Gestation, Breech   Skin incision type: 1 Pfannenstiel   Uterine Incision type: Low Transverse      Delivery location: OR       Delivery Providers    Delivering Clinician: Nguyen Webber MD   Delivery personnel:  Provider Role   Alisia Mcdonald, RN Baby Nurse   Krysta Gonzalez RN Delivery Nurse   Danna Armstrong              Cord    Vessels: 3 Vessels  Complications: Nuchal  # of loops: 1  Timed cord clamping: Yes  Time in sec: 30  Cord blood disposition: to lab  Gases sent?: No       Resuscitation    Method: None       [place] Measurements      Weight: 3040 g 6 lb 11.2 oz Length: 48.3 cm     Head circum.: 34 cm Chest circum.: 31 cm      Abdominal circum.: 30 cm           Placenta    Date/time: 3/21/2024 1410  Removal: Expressed  Appearance: Intact  Disposition: Pathology       Apgars    Living status: Living   Apgar Scoring Key:    0 1 2    Skin color Blue or pale Acrocyanotic Completely pink    Heart rate  Absent <100 bpm >100 bpm    Reflex irritability No response Grimace Cry or active withdrawal    Muscle tone Limp Some flexion Active motion    Respiratory effort Absent Weak cry; hypoventilation Good, crying              1 Minute:  5 Minute:  10 Minute:  15 Minute:  20 Minute:      Skin color: 1  1       Heart rate: 2  2       Reflex irritablity: 2  2       Muscle tone: 2  2       Respiratory effort: 2  2       Total: 9  9          Apgars assigned by: RICA  [place] disposition: with mother       Skin to Skin    Skin to skin with: Mother  Reason skin to skin not initiated: Patient Refused       Vaginal Count    No data filed       Lacerations    Episiotomy: None  Perineal lacerations: None      Vaginal laceration?: No      Cervical laceration?: No    Clitoral laceration?: No    Quantitative blood loss (mL): 860            Irvin Leryo  2 [[id number]]      Labor Events     labor?: No  Antibiotics received during labor?: Yes  Antibiotics (enter # doses in comment): clindamycin, gentamicin (Comment: pre-op antibiotics sent to OR)  Rupture date/time: 3/21/2024 1409     Fluid color: Clear  Intrapartum & labor complications comment: Di-di twins, fetal growth restriction twin B, breech presentation twin B, diet controlled gestational diabetes       Labor Length    3rd stage: 0h 00m       [place] Presentation    Presentation: Breech  Position: Right Sacrum Anterior       Operative Delivery    Operative Vaginal Delivery: N/A                Shoulder Dystocia    Shoulder Dystocia: N/A       Anesthesia    Method: Spinal              [place] Delivery      Delivery date/time:  3/21/24 14:09:37   Delivery type: Caesarean Section  Breech presentation: Complete    Details:   categorization: Primary    priority: scheduled   Indications for : Breech, Multiple Gestation   Skin incision type: 1 Pfannenstiel   Uterine Incision type: Low Transverse      Delivery location: OR       Delivery  Providers    Delivering Clinician: Nguyen Webber MD   Delivery personnel:  Provider Role   Alisia Mcdonald RN Baby Nurse   Krysta Gonzalez RN Delivery Nurse   Danna Armstrong              Cord    Vessels: 3 Vessels  Complications: None  Timed cord clamping: Yes  Time in sec: 30  Cord blood disposition: cord blood bank, to lab  Gases sent?: No       Resuscitation    Method: Oxygen       [place] Measurements      Weight: 2770 g 6 lb 1.7 oz Length: 50.8 cm     Head circum.: 33 cm Chest circum.: 31 cm      Abdominal circum.: 29 cm           Placenta    Date/time: 3/21/2024 1410  Removal: Expressed  Appearance: Intact  Disposition: Pathology       Apgars    Living status: Living   Apgar Scoring Key:    0 1 2    Skin color Blue or pale Acrocyanotic Completely pink    Heart rate Absent <100 bpm >100 bpm    Reflex irritability No response Grimace Cry or active withdrawal    Muscle tone Limp Some flexion Active motion    Respiratory effort Absent Weak cry; hypoventilation Good, crying              1 Minute:  5 Minute:  10 Minute:  15 Minute:  20 Minute:      Skin color: 0  1       Heart rate: 2  2       Reflex irritablity: 2  2       Muscle tone: 2  2       Respiratory effort: 2  2       Total: 8  9          Apgars assigned by: RICA   disposition: with mother       Skin to Skin    Reason skin to skin not initiated: Patient Refused       Vaginal Count    No data filed       Lacerations    Episiotomy: None  Perineal lacerations: None      Vaginal laceration?: No      Cervical laceration?: No    Clitoral laceration?: No                See operative report

## 2024-03-21 NOTE — ANESTHESIA PROCEDURE NOTES
Spinal Block    Date/Time: 3/21/2024 1:43 PM    Performed by: Mina Mullins MD  Authorized by: Mina Mullins MD      General Information and Staff    Start Time:  3/21/2024 1:43 PM  End Time:  3/21/2024 1:47 PM  Anesthesiologist:  Mina Mullins MD  Performed by:  Anesthesiologist  Patient Location:  OB  Site identification: surface landmarks    Preanesthetic Checklist: patient identified, IV checked, risks and benefits discussed, monitors and equipment checked, pre-op evaluation, timeout performed, anesthesia consent and sterile technique used      Procedure Details    Patient Position:  Sitting  Prep: ChloraPrep    Monitoring:  Cardiac monitor, heart rate and continuous pulse ox  Approach:  Midline  Location:  L3-4  Injection Technique:  Single-shot    Needle    Needle Type:  Sprotte  Needle Gauge:  24 G  Needle Length:  3.5 in    Assessment    Sensory Level:   Events: clear CSF, CSF aspirated, well tolerated and blood negative      Additional Comments

## 2024-03-21 NOTE — ANESTHESIA POSTPROCEDURE EVALUATION
[facility]    Marielena Leroy Patient Status:  Inpatient   Age/Gender 28 year old female MRN [id number]   Location [facility] LABOR & DELIVERY Attending Nguyen Webber MD   Hosp Day # 0 PCP No primary care provider on file.       Anesthesia Post-op Note    DILATION AND CURETTAGE    Procedure Summary       Date: 03/21/24 Room / Location:  L+D OR 01 /  L+D OR    Anesthesia Start: 1550 Anesthesia Stop: 1642    Procedure: DILATION AND CURETTAGE (Bilateral: Uterus) Diagnosis:     Surgeons: Nguyen Webber MD Anesthesiologist: Mina Mullins MD    Anesthesia Type: MAC ASA Status: 2 - Emergent            Anesthesia Type: MAC    Vitals Value Taken Time   /80 03/21/24 1700   Temp 98.0 03/21/24 1708   Pulse 146 03/21/24 1707   Resp 16 03/21/24 1708   SpO2 100 % 03/21/24 1707   Vitals shown include unfiled device data.    Patient Location: Same Day Surgery    Anesthesia Type: MAC    Airway Patency: patent    Postop Pain Control: adequate    Mental Status: mildly sedated but able to meaningfully participate in the post-anesthesia evaluation    Nausea/Vomiting: none    Cardiopulmonary/Hydration status: stable euvolemic    Complications: no apparent anesthesia related complications    Postop vital signs: stable    Dental Exam: Unchanged from Preop    Patient to be discharged from PACU when criteria met.

## 2024-03-21 NOTE — DISCHARGE INSTRUCTIONS
Nothing in vagina for 6 weeks.     AVOID CONSTIPATION:   -Take Miralax one capful in water or juice each morning.  You can also take each evening iif needed.  -Take Fiber supplement along with Miralax as well.  -May also take milk of magnesia or Dulcolax over the counter if needing to have a BM more urgently than Miralax is providing    -Do NOT strain for bowel movements    No strenuous activity/exercise  No tub baths.  No lifting over 10 pounds (1 gallon of mild is 8 pounds) for 6 weeks   May shower immediately  Keep wound(s) clean and dry. Wash daily with warm water & soap. Do not scrub. Gently pat dry. May cover with clean gauze or pad if needed.   Can wash with Hibiclens over the abdomen/torso to help decrease bacterial colonization if you prefer. It is alcohol-based so it can be somewhat drying.     You may ride in a car immediately.  You may drive after 1-2 weeks. Must not drive if taking a narcotic (e.g. Norco, Oxycodone) or gabapentin as these are sedating.   Nothing in the vagina or 6 weeks     Please call office if:  -fever 100.4 or higher    Please proceed to the Emergency Department at [facility] for any of the following:   -vaginal bleeding soaking greater than 1 pad per hour  -severe pelvic pain  -shortness of breath  -chest pain  -leg pain or swelling  -persistent or severe headache  -vision changes  -persistent or severe upper abdominal pain  -feeling depressed or extremely anxious  -thoughts of self harm or harming infant(s).

## 2024-03-21 NOTE — ANESTHESIA PREPROCEDURE EVALUATION
PRE-OP EVALUATION    Patient Name: Marielena Leroy    Admit Diagnosis: Dichorionic diamniotic twin gestation (HCC)    Pre-op Diagnosis: * No pre-op diagnosis entered *     SECTION    Anesthesia Procedure:  SECTION    Surgeon(s) and Role:     * Nguyen eWbber MD - Primary    Pre-op vitals reviewed.        Body mass index is 32.37 kg/m².    Current medications reviewed.  Hospital Medications:   lactated ringers IV bolus 1,000 mL  1,000 mL Intravenous Once    Followed by    lactated ringers infusion  125 mL/hr Intravenous Continuous    acetaminophen (Tylenol Extra Strength) tab 1,000 mg  1,000 mg Oral Once    ondansetron (Zofran) 4 MG/2ML injection 4 mg  4 mg Intravenous Q6H PRN    sodium citrate-citric acid (Bicitra) 500-334 MG/5ML oral solution 30 mL  30 mL Oral Once    oxyTOCIN in sodium chloride 0.9% (Pitocin) 30 Units/500mL infusion premix  62.5-900 krystal-units/min Intravenous Continuous    gentamicin sulfate (Garamycin) 40 MG/ML injection 400 mg  5 mg/kg Intravenous Once    And    sodium chloride 0.9% infusion 100 mL  100 mL Intravenous Once    And    clindamycin phosphate in NaCl 0.9% (Cleocin) 900 mg/50mL IVPB premix 900 mg  900 mg Intravenous Once       Outpatient Medications:     Facility-Administered Medications Prior to Admission   Medication Dose Route Frequency Provider Last Rate Last Admin    [COMPLETED] Rho D immune globulin (RhoGAM) IM injection 300 mcg  300 mcg Intramuscular Once Nguyen Webber MD   300 mcg at 24 0906     Medications Prior to Admission   Medication Sig Dispense Refill Last Dose    prenatal vitamin with DHA 27-0.8-228 MG Oral Cap Take 1 capsule by mouth daily.   3/20/2024    pantoprazole (PROTONIX) 40 MG Oral Tab EC Take 1 tablet (40 mg total) by mouth daily. (Patient not taking: Reported on 2024) 30 tablet 11     [] teraconazole 0.4 % Vaginal Cream Place 1 applicator vaginally nightly for 7 days. 45 g 0     [] teraconazole 0.4 % Vaginal  Cream Place 1 applicator vaginally nightly for 7 days. (Patient not taking: Reported on 12/22/2023) 45 g 0        Allergies: Amoxicillin, Ampicillin, Penicillin g, and Penicillins      Anesthesia Evaluation    Patient summary reviewed.    Anesthetic Complications  (-) history of anesthetic complications         GI/Hepatic/Renal    Negative GI/hepatic/renal ROS.                             Cardiovascular    Negative cardiovascular ROS.    Exercise tolerance: good     MET: >4                                           Endo/Other      (+) diabetes and well controlled, gestational, not using insulin          (+) thalassemia (alpha thaassemia trait)               Pulmonary    Negative pulmonary ROS.                       Neuro/Psych    Negative neuro/psych ROS.                                  Past Surgical History:   Procedure Laterality Date    WISDOM TEETH REMOVED       Social History     Socioeconomic History    Marital status: Single   Tobacco Use    Smoking status: Every Day    Smokeless tobacco: Never   Vaping Use    Vaping Use: Never used   Substance and Sexual Activity    Alcohol use: Not Currently     Comment: soc.    Drug use: Never    Sexual activity: Yes     Partners: Male     History   Drug Use Unknown     Available pre-op labs reviewed.  Lab Results   Component Value Date    WBC 5.2 03/21/2024    RBC 4.55 03/21/2024    HGB 12.6 03/21/2024    HCT 37.9 03/21/2024    MCV 83.3 03/21/2024    MCH 27.7 03/21/2024    MCHC 33.2 03/21/2024    RDW 14.7 03/21/2024    .0 03/21/2024               Airway      Mallampati: II  Mouth opening: >3 FB  TM distance: > 6 cm  Neck ROM: full Cardiovascular    Cardiovascular exam normal.  Rhythm: regular  Rate: normal     Dental    Dentition appears grossly intact         Pulmonary    Pulmonary exam normal.  Breath sounds clear to auscultation bilaterally.               Other findings              ASA: 2   Plan: spinal  NPO status verified and patient meets  guidelines.  Patient has not taken beta blockers in last 24 hours.      Comment: The risks of neuraxial anesthesia, which include bleeding, infection, headache, nerve injury, and failed attempts, were discussed with the patient and her partner. The patient understands the risks, benefits, and alternatives, and agrees to proceed with a spinal anesthetic for her .  Plan/risks discussed with: patient and spouse  Use of blood product(s) discussed with: patient    Consented to blood products.          Present on Admission:   Fetal growth restriction twin B   Diet controlled gestational diabetes mellitus (GDM) in third trimester ([facility])   Dichorionic diamniotic twin pregnancy in third trimester ([facility])   Breech presentation, antepartum, fetus 2 ([facility])
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
## 2024-03-21 NOTE — PLAN OF CARE
Problem: BIRTH - VAGINAL/ SECTION  Goal: Fetal and maternal status remain reassuring during the birth process  Description: INTERVENTIONS:  - Monitor vital signs  - Monitor fetal heart rate  - Monitor uterine activity  - Monitor labor progression (vaginal delivery)  - DVT prophylaxis (C/S delivery)  - Surgical antibiotic prophylaxis (C/S delivery)  Outcome: Progressing     Problem: PAIN - ADULT  Goal: Verbalizes/displays adequate comfort level or patient's stated pain goal  Description: INTERVENTIONS:  - Encourage pt to monitor pain and request assistance  - Assess pain using appropriate pain scale  - Administer analgesics based on type and severity of pain and evaluate response  - Implement non-pharmacological measures as appropriate and evaluate response  - Consider cultural and social influences on pain and pain management  - Manage/alleviate anxiety  - Utilize distraction and/or relaxation techniques  - Monitor for opioid side effects  - Notify MD/LIP if interventions unsuccessful or patient reports new pain  - Anticipate increased pain with activity and pre-medicate as appropriate  Outcome: Progressing     Problem: ANXIETY  Goal: Will report anxiety at manageable levels  Description: INTERVENTIONS:  - Administer medication as ordered  - Teach and rehearse alternative coping skills  - Provide emotional support with 1:1 interaction with staff  Outcome: Progressing     Problem: Patient/Family Goals  Goal: Patient/Family Long Term Goal  Description: Patient's Long Term Goal: Pain control     Interventions:    - See additional Care Plan goals for specific interventions  Outcome: Progressing  Goal: Patient/Family Short Term Goal  Description: Patient's Short Term Goal:  Healthy delivery     Interventions:   -   - See additional Care Plan goals for specific interventions  Outcome: Progressing

## 2024-03-21 NOTE — PROGRESS NOTES
OB progress    Of note, 1st BP pre hypertensive range at 133/85   Asymptomatic.     Uric slightly high & urine P/C elevated to 1.15.  Platelets, LFTs, creatinine ok    Patient does not meet criteria for pre-eclampsia but will watch closely    Called by RN in recovery - patient passed a large amount of clot  Patient pale  Bimanual exam with clot stuck in the fundus. Could not reach all the way up there.     Recommend we go back to OR for US guided suction D&C, probable placement of Alice device    Stat CBC, CMP, PT, PTT, Fibrinogen, cross 2 units ordered.     Vitals:    03/21/24 1315 03/21/24 1455 03/21/24 1500 03/21/24 1515   BP: 133/85 117/74 114/72 123/86   BP Location:  Right arm Right arm    Pulse: 69 53 51 53   Resp:  (!) 6 15 24   Temp:  97.8 °F (36.6 °C)     TempSrc:  Oral     Weight:         Temp:  [97.8 °F (36.6 °C)-97.9 °F (36.6 °C)] 97.8 °F (36.6 °C)  Pulse:  [51-69] 53  Resp:  [6-24] 24  BP: (114-133)/(72-86) 123/86     Component      Latest Ref Rng 3/21/2024   WBC      4.0 - 11.0 x10(3) uL 5.2    RBC      3.80 - 5.30 x10(6)uL 4.55    Hemoglobin      12.0 - 16.0 g/dL 12.6    Hematocrit      35.0 - 48.0 % 37.9    Platelet Count      150.0 - 450.0 10(3)uL 168.0    MCV      80.0 - 100.0 fL 83.3    Glucose      70 - 99 mg/dL 75    Sodium      136 - 145 mmol/L 136    Potassium      3.5 - 5.1 mmol/L 4.3    Chloride      98 - 112 mmol/L 110    Carbon Dioxide, Total      21.0 - 32.0 mmol/L 19.0 (L)    ANION GAP      0 - 18 mmol/L 7    BUN      9 - 23 mg/dL 15    CREATININE      0.55 - 1.02 mg/dL 0.72    CALCIUM      8.5 - 10.1 mg/dL 9.8    CALCULATED OSMOLALITY      275 - 295 mOsm/kg 282    EGFR      >=60 mL/min/1.73m2 115    AST (SGOT)      15 - 37 U/L 30    ALT (SGPT)      13 - 56 U/L 23    ALKALINE PHOSPHATASE      37 - 98 U/L 251 (H)    Total Bilirubin      0.1 - 2.0 mg/dL 0.4    PROTEIN, TOTAL      6.4 - 8.2 g/dL 6.3 (L)    Albumin      3.4 - 5.0 g/dL 2.8 (L)    Globulin      2.8 - 4.4 g/dL 3.5    A/G  Ratio      1.0 - 2.0  0.8 (L)    TOTAL PROTEIN URINE RANDOM      mg/dL 111.8    CREATININE UR RANDOM      mg/dL 97.30    Urine Protein/Creatinine Ratio, Random 1.15    URIC ACID      2.6 - 6.0 mg/dL 6.3 (H)
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
## 2024-03-21 NOTE — PROGRESS NOTES
OB progress    Vitals:    03/21/24 1632 03/21/24 1645 03/21/24 1700 03/21/24 1730   BP: (!) 87/55 110/76 108/80 102/87   BP Location:       Pulse: 105   83   Resp:    22   Temp:       TempSrc:       Weight:         Patient still in recovery area  Still pale, but conscious & appropriate with mentation    Alice device to wall suction. Nothing in cannister  Fundus firm and low  Montez to gravity    Had to have another IV placed for transfusion of 1 unit PRBC - which is currently running.   Has 1 other unit still crossed.     Stat labs from initial recovery area just prior to going back to OR from suction D&C with Hb 12.7, platelets 167k, Cr 0.78, INR & PTT ok. Fibrinogen 427.     Given large blood loss, will continue IV clindamycin q 8 hr x 24 hours & re-dose gentamicin tomorrow morning (spoke with pharmacist, who did not recommend re-dosing the gentamicin today despite the large blood loss as the dose she was given was 5 mg/kg 24 hr dosing.)      Plan repeat labs - CBC, CMP, INR, PTT, Fibrinogen tonight at 2000 - approx 4 hr after her 1st set of stat labs done at 1540.     IV Iron x 2 doses have been ordered - 1st dose tonight.     Updated Dr. Everett, who is covering for our group currently.     Component      Latest Ref Rng 3/21/2024  3:40 PM   WBC      4.0 - 11.0 x10(3) uL 10.1    RBC      3.80 - 5.30 x10(6)uL 4.58    Hemoglobin      12.0 - 16.0 g/dL 12.7    Hematocrit      35.0 - 48.0 % 38.7    Platelet Count      150.0 - 450.0 10(3)uL 167.0    Immature Platelet Fraction      0.0 - 7.0 % 14.5 (H)    MCV      80.0 - 100.0 fL 84.5    Glucose      70 - 99 mg/dL 90    Sodium      136 - 145 mmol/L 136    Potassium      3.5 - 5.1 mmol/L 5.2 (H)    Chloride      98 - 112 mmol/L 109    Carbon Dioxide, Total      21.0 - 32.0 mmol/L 21.0    ANION GAP      0 - 18 mmol/L 6    BUN      9 - 23 mg/dL 14    CREATININE      0.55 - 1.02 mg/dL 0.78    CALCIUM      8.5 - 10.1 mg/dL 9.0    CALCULATED OSMOLALITY      275 - 295 mOsm/kg  282    EGFR      >=60 mL/min/1.73m2 105    AST (SGOT)      15 - 37 U/L 27    ALT (SGPT)      13 - 56 U/L 21    ALKALINE PHOSPHATASE      37 - 98 U/L 213 (H)    Total Bilirubin      0.1 - 2.0 mg/dL 0.5    PROTEIN, TOTAL      6.4 - 8.2 g/dL 5.9 (L)    Albumin      3.4 - 5.0 g/dL 2.4 (L)    Globulin      2.8 - 4.4 g/dL 3.5    A/G Ratio      1.0 - 2.0  0.7 (L)    PT      11.6 - 14.8 seconds 13.3    INR      0.80 - 1.20  1.01    APTT      23.3 - 35.6 seconds 31.3    Fibrinogen      180 - 480 mg/dl 427       Nguyen Webber MD

## 2024-03-21 NOTE — OPERATIVE REPORT
OPERATIVE REPORT:     PATIENT: Marielena Leroy   MRN: [id number]    DATE OF PROCEDURE: 3/21/2024     INDICATIONS:   28 year old  female postpartum day #0 status post primary low transverse  section at 39w0d for di-di twins.  mL in the OR. Patient then started passing some large clots while     PRE-OP DIAGNOSIS:   Postpartum day zero status post PLTCS  Postpartum hemorrhage from uterine atony with retained clot within fundus    POST-OP DIAGNOSIS:   Postpartum day zero status post PLTCS  Postpartum hemorrhage from uterine atony with retained clot within fundus    PROCEDURE(S):   Ultrasound guided suction dilatation and curettage of uterus   Insertion of Alice intrauterine device at 1410     ANESTHESIA: IV sedation/MAC  ANTIBIOTICS: Clindamycin     SURGEON(S):Nguyen Webber MD     ESTIMATED BLOOD LOSS  600 mL during this procedure in the OR (Had lost 860 mL at time of  & then had 485 mL on fundal check per RN & 445 mL per my uterine manual evacuation for a total of about 2390 mL total QBL            DRAINS: Montez catheter in place from previous  section. Alice intrauterine device            SPECIMENS: Uterine contents            IMPLANTS: None           COMPLICATIONS:  None apparent     FINDINGS: Normal external genitalia, no lesions. Dilated cervix. Blood and clot in vagina. Anteverted uterus. Moderate to large amount of clot. Specimen sent to pathology. Good hemostasis.     PROCEDURE: This procedure was fully reviewed with the patient and written informed consent was obtained after discussing risks, benefits, indication and alternatives. All questions were answered.     The patient was taken to operative room. SCDs and IV antibiotics were administered. Anesthesia was administered. She was placed in dorsal lithotomy position. Bimanual exam performed. She was prepped and draped in normal sterile fashion. Bladder already draining. A weighted speculum was placed in the vagina. A ringed  forceps was used to grasp the anterior lip of the cervix. The cervix was already dilated. A 14 mm curved suction curette was then gently advanced into the uterine cavity under ultrasound guidance and the vacuum was activated.     Moderate amount of clot was noted. This was repeated a few more times with decreasing to minimal tissue noted for each pass. A Alice intrauterine device was inserted. The cervical occluding balloon was inflated with 120 mL saline and the device was connected to wall suction at 80 mm Hg.     The procedure then concluded. The specimen was sent to pathology. Vaginal instruments were removed. Good hemostasis was noted. All instruments were then removed from the vagina. Sponge, lap, needle, and instrument counts correct by two counts. The patient was taken to recovery room in stable condition.     DISPOSITION:  To recovery room in stable condition        CONDITION: Stable      Nguyen Webber MD  [facility] [place] - OBGYN

## 2024-03-22 LAB
ALBUMIN SERPL-MCNC: 1.8 G/DL (ref 3.4–5)
ALBUMIN/GLOB SERPL: 0.7 {RATIO} (ref 1–2)
ALP LIVER SERPL-CCNC: 140 U/L
ALT SERPL-CCNC: 19 U/L
ANION GAP SERPL CALC-SCNC: 3 MMOL/L (ref 0–18)
APTT PPP: 28.1 SECONDS (ref 23.3–35.6)
AST SERPL-CCNC: 43 U/L (ref 15–37)
BASOPHILS # BLD AUTO: 0.01 X10(3) UL (ref 0–0.2)
BASOPHILS NFR BLD AUTO: 0.1 %
BILIRUB SERPL-MCNC: 0.3 MG/DL (ref 0.1–2)
BUN BLD-MCNC: 16 MG/DL (ref 9–23)
CALCIUM BLD-MCNC: 8.2 MG/DL (ref 8.5–10.1)
CHLORIDE SERPL-SCNC: 110 MMOL/L (ref 98–112)
CO2 SERPL-SCNC: 21 MMOL/L (ref 21–32)
CREAT BLD-MCNC: 0.68 MG/DL
EGFRCR SERPLBLD CKD-EPI 2021: 120 ML/MIN/1.73M2 (ref 60–?)
EOSINOPHIL # BLD AUTO: 0 X10(3) UL (ref 0–0.7)
EOSINOPHIL NFR BLD AUTO: 0 %
ERYTHROCYTE [DISTWIDTH] IN BLOOD BY AUTOMATED COUNT: 14.6 %
FIBRINOGEN PPP-MCNC: 340 MG/DL (ref 180–480)
GLOBULIN PLAS-MCNC: 2.7 G/DL (ref 2.8–4.4)
GLUCOSE BLD-MCNC: 108 MG/DL (ref 70–99)
GLUCOSE BLD-MCNC: 95 MG/DL (ref 70–99)
HCT VFR BLD AUTO: 22.9 %
HGB BLD-MCNC: 7.8 G/DL
IMM GRANULOCYTES # BLD AUTO: 0.06 X10(3) UL (ref 0–1)
IMM GRANULOCYTES NFR BLD: 0.4 %
INR BLD: 1.04 (ref 0.8–1.2)
LYMPHOCYTES # BLD AUTO: 0.71 X10(3) UL (ref 1–4)
LYMPHOCYTES NFR BLD AUTO: 5 %
MCH RBC QN AUTO: 28.5 PG (ref 26–34)
MCHC RBC AUTO-ENTMCNC: 34.1 G/DL (ref 31–37)
MCV RBC AUTO: 83.6 FL
MONOCYTES # BLD AUTO: 0.77 X10(3) UL (ref 0.1–1)
MONOCYTES NFR BLD AUTO: 5.4 %
NEUTROPHILS # BLD AUTO: 12.68 X10 (3) UL (ref 1.5–7.7)
NEUTROPHILS # BLD AUTO: 12.68 X10(3) UL (ref 1.5–7.7)
NEUTROPHILS NFR BLD AUTO: 89.1 %
OSMOLALITY SERPL CALC.SUM OF ELEC: 279 MOSM/KG (ref 275–295)
PLATELET # BLD AUTO: 128 10(3)UL (ref 150–450)
POTASSIUM SERPL-SCNC: 4.7 MMOL/L (ref 3.5–5.1)
PROT SERPL-MCNC: 4.5 G/DL (ref 6.4–8.2)
PROTHROMBIN TIME: 13.6 SECONDS (ref 11.6–14.8)
RBC # BLD AUTO: 2.74 X10(6)UL
RPR SER QL: NONREACTIVE
SODIUM SERPL-SCNC: 134 MMOL/L (ref 136–145)
WBC # BLD AUTO: 14.2 X10(3) UL (ref 4–11)

## 2024-03-22 RX ORDER — NALBUPHINE HYDROCHLORIDE 10 MG/ML
2.5 INJECTION, SOLUTION INTRAMUSCULAR; INTRAVENOUS; SUBCUTANEOUS
Status: DISCONTINUED | OUTPATIENT
Start: 2024-03-22 | End: 2024-03-25

## 2024-03-22 RX ORDER — DIPHENHYDRAMINE HCL 25 MG
25 CAPSULE ORAL EVERY 4 HOURS PRN
Status: DISCONTINUED | OUTPATIENT
Start: 2024-03-22 | End: 2024-03-25

## 2024-03-22 RX ORDER — SODIUM CHLORIDE, SODIUM LACTATE, POTASSIUM CHLORIDE, CALCIUM CHLORIDE 600; 310; 30; 20 MG/100ML; MG/100ML; MG/100ML; MG/100ML
INJECTION, SOLUTION INTRAVENOUS CONTINUOUS
Status: DISCONTINUED | OUTPATIENT
Start: 2024-03-22 | End: 2024-03-25

## 2024-03-22 RX ORDER — ACETAMINOPHEN 325 MG/1
650 TABLET ORAL ONCE
Status: DISCONTINUED | OUTPATIENT
Start: 2024-03-22 | End: 2024-03-25

## 2024-03-22 RX ORDER — SODIUM CHLORIDE 9 MG/ML
INJECTION, SOLUTION INTRAVENOUS ONCE
Status: DISCONTINUED | OUTPATIENT
Start: 2024-03-22 | End: 2024-03-25

## 2024-03-22 RX ORDER — VANCOMYCIN HYDROCHLORIDE
15 EVERY 12 HOURS
Status: COMPLETED | OUTPATIENT
Start: 2024-03-22 | End: 2024-03-22

## 2024-03-22 RX ORDER — ONDANSETRON 2 MG/ML
4 INJECTION INTRAMUSCULAR; INTRAVENOUS ONCE AS NEEDED
Status: ACTIVE | OUTPATIENT
Start: 2024-03-22 | End: 2024-03-22

## 2024-03-22 RX ORDER — DIPHENHYDRAMINE HCL 25 MG
25 CAPSULE ORAL ONCE
Status: COMPLETED | OUTPATIENT
Start: 2024-03-22 | End: 2024-03-22

## 2024-03-22 RX ORDER — CEFAZOLIN SODIUM/WATER 2 G/20 ML
2 SYRINGE (ML) INTRAVENOUS EVERY 8 HOURS
Status: DISCONTINUED | OUTPATIENT
Start: 2024-03-22 | End: 2024-03-22

## 2024-03-22 RX ORDER — DIPHENHYDRAMINE HYDROCHLORIDE 50 MG/ML
12.5 INJECTION INTRAMUSCULAR; INTRAVENOUS EVERY 4 HOURS PRN
Status: DISCONTINUED | OUTPATIENT
Start: 2024-03-22 | End: 2024-03-25

## 2024-03-22 RX ORDER — NALBUPHINE HYDROCHLORIDE 10 MG/ML
2.5 INJECTION, SOLUTION INTRAMUSCULAR; INTRAVENOUS; SUBCUTANEOUS EVERY 4 HOURS PRN
Status: DISCONTINUED | OUTPATIENT
Start: 2024-03-22 | End: 2024-03-25

## 2024-03-22 RX ORDER — ONDANSETRON 2 MG/ML
4 INJECTION INTRAMUSCULAR; INTRAVENOUS EVERY 6 HOURS PRN
Status: DISCONTINUED | OUTPATIENT
Start: 2024-03-22 | End: 2024-03-25

## 2024-03-22 RX ORDER — DIPHENHYDRAMINE HYDROCHLORIDE 50 MG/ML
25 INJECTION INTRAMUSCULAR; INTRAVENOUS EVERY 4 HOURS PRN
Status: DISCONTINUED | OUTPATIENT
Start: 2024-03-22 | End: 2024-03-25

## 2024-03-22 RX ORDER — VANCOMYCIN HYDROCHLORIDE
15 EVERY 12 HOURS
Status: DISCONTINUED | OUTPATIENT
Start: 2024-03-22 | End: 2024-03-22 | Stop reason: ALTCHOICE

## 2024-03-22 RX ORDER — NALOXONE HYDROCHLORIDE 0.4 MG/ML
0.08 INJECTION, SOLUTION INTRAMUSCULAR; INTRAVENOUS; SUBCUTANEOUS
Status: ACTIVE | OUTPATIENT
Start: 2024-03-22 | End: 2024-03-23

## 2024-03-22 NOTE — PROGRESS NOTES
Patient declined Clindamycin this Am due to allergic reaction of itchiness. RN offered to get an order for benadryl to help alleviate any allergic reaction so that way she can continue the abx course. PT refused. Risk and benefits explained to the pt. MD aware.

## 2024-03-22 NOTE — PROGRESS NOTES
120 cc NS removed from Alice device.  Will remove it entirely after 30 min.  Due to patient pruritus, patient declines further antibiotics.    Will give benadryl and stop gent and clinda.   After discussing w Rns, received 1 dose of gent (24h) and 2 doses of clinda.     ----------------------------------------------------------------------------    Consulted pharmacy and they recommend ancef - citing there should not be cross-reactivity with allergy to peniccilin.  Patient declines.  Will move forward w vancomycin per pharmacy's recommendation    -------------------------------------------------------------------------------    Post Partum Progress note    Subjective:   Pain well controlled. Lochia normal. Tolerating PO. +flatus. +voiding.  +ambulating. Plans to breastfeed. No other complaints.     Has not yet gotten vanc    Objective:  Vitals:    03/22/24 0230 03/22/24 0800 03/22/24 1205 03/22/24 1245   BP: 122/84 119/80 (!) 128/97 117/77   BP Location:   Left arm Left arm   Pulse: 90 91 84 85   Resp:  16 16    Temp:  98.5 °F (36.9 °C) 98 °F (36.7 °C)    TempSrc:  Oral Oral    Weight:         Temp:  [97.8 °F (36.6 °C)-98.5 °F (36.9 °C)] 98 °F (36.7 °C)  Pulse:  [] 85  Resp:  [12-25] 16  BP: ()/() 117/77  Date 03/22/24 0700 - 03/23/24 0659   Shift 1934-7519 0995-7033 6270-9019 24 Hour Total   INTAKE   P.O. 400 400  800   IV PIGGYBACK 250   250   Shift Total(mL/kg) 650(8.1) 400(5)  1050(13.1)   OUTPUT   Urine(mL/kg/hr) 1200(1.9)   1200   Shift Total(mL/kg) 1200(14.9)   1200(14.9)   Weight (kg) 80.3 80.3 80.3 80.3       vancomycin  15 mg/kg Intravenous Q12H    sodium chloride  100 mL Intravenous Once    acetaminophen  1,000 mg Oral Q6H    ketorolac  30 mg Intravenous Q6H    ibuprofen  600 mg Oral Q6H    docusate sodium  100 mg Oral BID@0600,1800    sodium chloride   Intravenous Once    tranexamic acid  1,000 mg Intravenous Once       Physical Exam  Gen A&O, NAD  CV regular rate  Lungs no increased  WOB  Abd soft, non-distended, fundus firm under umbilicus  Incision c/d/i  Ext nontender, +1 edema    Recent Labs   Lab 24  1540 24  0814   RBC 4.58 3.88 2.74*   HGB 12.7 10.9* 7.8*   HCT 38.7 32.9* 22.9*   MCV 84.5 84.8 83.6   MCH 27.7 28.1 28.5   MCHC 32.8 33.1 34.1   RDW 14.7 14.6 14.6   NEPRELIM 8.33* 15.88* 12.68*   WBC 10.1 16.9* 14.2*   .0 161.0 128.0*       Recent Labs   Lab 24  1540 24  0814   GLU 90 116* 95   BUN 14 18 16   CREATSERUM 0.78 0.87 0.68   EGFRCR 105 92 120   CA 9.0 8.5 8.2*   ALB 2.4* 2.3* 1.8*    135* 134*   K 5.2* 4.6 4.7    110 110   CO2 21.0 21.0 21.0   ALKPHO 213* 191* 140*   AST 27 35 43*   ALT 21 23 19   BILT 0.5 0.4 0.3   TP 5.9* 5.6* 4.5*       @MG@      Assessment and Plan: 28 year old  now POD#1 s/p pLTCS for di/di twins and s/p separate suction dilation and curettage for post partum hemorrhage due to uterine atony    #Acute blood loss anemia: surgical blood loss and atony.  Total QBL 2390.    S/p uterotonics and kev, D&C  HgB 12.7 to 10.9 - s/p 1u pRBC - HgB 7.8 today.  Nl coags. Plan transfusion of 1 additional unit pRBC [ ]     #Ppx abx:   - received 1x genta and 1x clinda at time of   - received 1x clinda at time of suction D&C  - Was put on gent/clinda x24h after hemorrhage and instrumentation however patient declined due to itching.    - Discussed w pharmacist and pt ok w vanc - ordered [ ]     #Rh negative status: Both babies are AB negative    #History of syphillis: T palllidum Ab reactive, RPR non reactive multiple times this pregnancy.  FOB NOT AWARE, do not discuss in front    SCDs, ambulation encouraged  Disposition: anticipate discharge POD 2-3      Noemy Higgins MD

## 2024-03-22 NOTE — PLAN OF CARE
Problem: BIRTH - VAGINAL/ SECTION  Goal: Fetal and maternal status remain reassuring during the birth process  Description: INTERVENTIONS:  - Monitor vital signs  - Monitor fetal heart rate  - Monitor uterine activity  - Monitor labor progression (vaginal delivery)  - DVT prophylaxis (C/S delivery)  - Surgical antibiotic prophylaxis (C/S delivery)  Outcome: Completed     Problem: PAIN - ADULT  Goal: Verbalizes/displays adequate comfort level or patient's stated pain goal  Description: INTERVENTIONS:  - Encourage pt to monitor pain and request assistance  - Assess pain using appropriate pain scale  - Administer analgesics based on type and severity of pain and evaluate response  - Implement non-pharmacological measures as appropriate and evaluate response  - Consider cultural and social influences on pain and pain management  - Manage/alleviate anxiety  - Utilize distraction and/or relaxation techniques  - Monitor for opioid side effects  - Notify MD/LIP if interventions unsuccessful or patient reports new pain  - Anticipate increased pain with activity and pre-medicate as appropriate  Outcome: Completed     Problem: ANXIETY  Goal: Will report anxiety at manageable levels  Description: INTERVENTIONS:  - Administer medication as ordered  - Teach and rehearse alternative coping skills  - Provide emotional support with 1:1 interaction with staff  Outcome: Completed     Problem: Patient/Family Goals  Goal: Patient/Family Long Term Goal  Description: Patient's Long Term Goal    Interventions:  -  - See additional Care Plan goals for specific interventions  Outcome: Progressing  Goal: Patient/Family Short Term Goal  Description: Patient's Short Term Goal:    Interventions:   -   - See additional Care Plan goals for specific interventions  Outcome: Progressing     Problem: POSTPARTUM  Goal: Long Term Goal:Experiences normal postpartum course  Description: INTERVENTIONS:  - Assess and monitor vital signs and lab  values.  - Assess fundus and lochia.  - Provide ice/sitz baths for perineum discomfort.  - Monitor healing of incision/episiotomy/laceration, and assess for signs and symptoms of infection and hematoma.  - Assess bladder function and monitor for bladder distention.  - Provide/instruct/assist with pericare as needed.  - Provide VTE prophylaxis as needed.  - Monitor bowel function.  - Encourage ambulation and provide assistance as needed.  - Assess and monitor emotional status and provide social service/psych resources as needed.  - Utilize standard precautions and use personal protective equipment as indicated. Ensure aseptic care of all intravenous lines and invasive tubes/drains.  - Obtain immunization and exposure to communicable diseases history.  Outcome: Progressing  Goal: Optimize infant feeding at the breast  Description: INTERVENTIONS:  - Initiate breast feeding within first hour after birth.   - Monitor effectiveness of current breast feeding efforts.  - Assess support systems available to mother/family.  - Identify cultural beliefs/practices regarding lactation, letdown techniques, maternal food preferences.  - Assess mother's knowledge and previous experience with breast feeding.  - Provide information as needed about early infant feeding cues (e.g., rooting, lip smacking, sucking fingers/hand) versus late cue of crying.  - Discuss/demonstrate breast feeding aids (e.g., infant sling, nursing footstool/pillows, and breast pumps).  - Encourage mother/other family members to express feelings/concerns, and actively listen.  - Educate father/SO about benefits of breast feeding and how to manage common lactation challenges.  - Recommend avoidance of specific medications or substances incompatible with breast feeding.  - Assess and monitor for signs of nipple pain/trauma.  - Instruct and provide assistance with proper latch.  - Review techniques for milk expression (breast pumping) and storage of breast milk.  Provide pumping equipment/supplies, instructions and assistance, as needed.  - Encourage rooming-in and breast feeding on demand.  - Encourage skin-to-skin contact.  - Provide LC support as needed.  - Assess for and manage engorgement.  - Provide breast feeding education handouts and information on community breast feeding support.   Outcome: Progressing  Goal: Establishment of adequate milk supply with medication/procedure interruptions  Description: INTERVENTIONS:  - Review techniques for milk expression (breast pumping).   - Provide pumping equipment/supplies, instructions, and assistance until it is safe to breastfeed infant.  Outcome: Progressing  Goal: Experiences normal breast weaning course  Description: INTERVENTIONS:  - Assess for and manage engorgement.  - Instruct on breast care.  - Provide comfort measures.  Outcome: Progressing  Goal: Appropriate maternal -  bonding  Description: INTERVENTIONS:  - Assess caregiver- interactions.  - Assess caregiver's emotional status and coping mechanisms.  - Encourage caregiver to participate in  daily care.  - Assess support systems available to mother/family.  - Provide /case management support as needed.  Outcome: Progressing

## 2024-03-23 LAB
BASOPHILS # BLD AUTO: 0.04 X10(3) UL (ref 0–0.2)
BASOPHILS NFR BLD AUTO: 0.4 %
EOSINOPHIL # BLD AUTO: 0.12 X10(3) UL (ref 0–0.7)
EOSINOPHIL NFR BLD AUTO: 1.1 %
ERYTHROCYTE [DISTWIDTH] IN BLOOD BY AUTOMATED COUNT: 14.9 %
HCT VFR BLD AUTO: 25.6 %
HGB BLD-MCNC: 9 G/DL
IMM GRANULOCYTES # BLD AUTO: 0.11 X10(3) UL (ref 0–1)
IMM GRANULOCYTES NFR BLD: 1 %
LYMPHOCYTES # BLD AUTO: 1.88 X10(3) UL (ref 1–4)
LYMPHOCYTES NFR BLD AUTO: 17.3 %
MCH RBC QN AUTO: 29.1 PG (ref 26–34)
MCHC RBC AUTO-ENTMCNC: 35.2 G/DL (ref 31–37)
MCV RBC AUTO: 82.8 FL
MONOCYTES # BLD AUTO: 0.53 X10(3) UL (ref 0.1–1)
MONOCYTES NFR BLD AUTO: 4.9 %
NEUTROPHILS # BLD AUTO: 8.19 X10 (3) UL (ref 1.5–7.7)
NEUTROPHILS # BLD AUTO: 8.19 X10(3) UL (ref 1.5–7.7)
NEUTROPHILS NFR BLD AUTO: 75.3 %
PLATELET # BLD AUTO: 165 10(3)UL (ref 150–450)
RBC # BLD AUTO: 3.09 X10(6)UL
WBC # BLD AUTO: 10.9 X10(3) UL (ref 4–11)

## 2024-03-23 RX ORDER — OXYCODONE HYDROCHLORIDE 5 MG/1
10 TABLET ORAL EVERY 6 HOURS PRN
Status: DISCONTINUED | OUTPATIENT
Start: 2024-03-23 | End: 2024-03-25

## 2024-03-23 NOTE — PROGRESS NOTES
[facility]  Obstetrics and Gynecology    OB/GYN: Postpartum Progress Note     SUBJECTIVE:  Patient is a 28 year old  female who is s/p PCS. She is POD# 2.   Doing well.  Denies fever, chills, N, V, chest pain and SOB. Bleeding has been stable. Voiding without difficulty.  Passing flatus.  denies Bm. Tolerating diet. Ambulating without difficulty.     OBJECTIVE:  Vitals:    24 1710 24 1800 24 1900 24 0820   BP: 109/61 117/67 112/68 123/71   Pulse: 78 78 79 100   Resp: 16 16 16 16   Temp: 98.1 °F (36.7 °C) 98.3 °F (36.8 °C) 98.3 °F (36.8 °C) 97.5 °F (36.4 °C)   TempSrc: Oral Oral Oral Oral   Weight:           Physical Exam:  Lungs:   CTAB    Cardiovascular:   RRR      General:    AAA. NAD.    Bowel Sounds:  active   Abdomen Soft, nontender, nondistended, +BS    Lochia:  appropriate   Uterine Fundus:   firm below umbilicus   Incision:  healing well, no significant drainage, no dehiscence, no significant erythema with steri strips in place    DVT Evaluation:  No evidence of DVT seen on physical exam.  Negative Fadia's sign.  No cords or calf tenderness.  No significant calf/ankle edema.     Urinary output is adequate.   I/O last 3 completed shifts:  In: 1381.2 [P.O.:800; Blood:331.2; IV PIGGYBACK:250]  Out: 1200 [Urine:1200]      Labs:  Recent Labs   Lab 24  0749   RBC 3.09*   HGB 9.0*   HCT 25.6*   MCV 82.8   MCH 29.1   MCHC 35.2   RDW 14.9   NEPRELIM 8.19*   WBC 10.9   .0       ASSESSMENT/PLAN:  Patient is a 28 year old  female who is s/p PCS POD# 2.     Doing well   Continue routine postpartum care  Vitals per routine   Encourage ambulation and IS use   PPH, anemia. S/p Alice, uterotonic medications and 2 units PRBC. IV iron x 2 doses.   Tobacco use, recommend nicotine patches while inpatient   Desires circumcision for baby boy. D/w patient risks, benefits and alternatives to procedure.   Plan for discharge to home   Follow up in 2 weeks        Negrita  MD Omer   [facility] - OBGYN      Note to patient and family   The 21st Century Cures Act makes medical notes available to patients in the interest of transparency.  However, please be advised that this is a medical document.  It is intended as ewll-fv-[id number] communication.  It is written and medical language may contain abbreviations or verbiage that are technical and unfamiliar.  It may appear blunt or direct.  Medical documents are intended to carry relevant information, facts as evident, and the clinical opinion of the practitioner.

## 2024-03-24 LAB
BLOOD TYPE BARCODE: 600
SYPHILIS AB BY TP-PA, SERUM: POSITIVE
UNIT VOLUME: 350 ML

## 2024-03-24 RX ORDER — NALOXONE HYDROCHLORIDE 4 MG/.1ML
4 SPRAY NASAL AS NEEDED
Qty: 1 KIT | Refills: 0 | Status: SHIPPED | OUTPATIENT
Start: 2024-03-24

## 2024-03-24 RX ORDER — IBUPROFEN 600 MG/1
600 TABLET ORAL EVERY 6 HOURS PRN
Qty: 30 TABLET | Refills: 0 | Status: SHIPPED | OUTPATIENT
Start: 2024-03-24

## 2024-03-24 RX ORDER — OXYCODONE HYDROCHLORIDE 10 MG/1
10 TABLET ORAL EVERY 6 HOURS PRN
Qty: 20 TABLET | Refills: 0 | Status: SHIPPED | OUTPATIENT
Start: 2024-03-24

## 2024-03-24 RX ORDER — GABAPENTIN 300 MG/1
300 CAPSULE ORAL EVERY 8 HOURS PRN
Qty: 21 CAPSULE | Refills: 0 | Status: SHIPPED | OUTPATIENT
Start: 2024-03-24

## 2024-03-24 RX ORDER — FUROSEMIDE 20 MG/1
20 TABLET ORAL DAILY
Status: DISCONTINUED | OUTPATIENT
Start: 2024-03-25 | End: 2024-03-24

## 2024-03-24 RX ORDER — PSEUDOEPHEDRINE HCL 30 MG
100 TABLET ORAL 2 TIMES DAILY PRN
Qty: 30 CAPSULE | Refills: 0 | Status: SHIPPED | OUTPATIENT
Start: 2024-03-24

## 2024-03-24 RX ORDER — FUROSEMIDE 20 MG/1
20 TABLET ORAL DAILY
Status: DISCONTINUED | OUTPATIENT
Start: 2024-03-24 | End: 2024-03-25

## 2024-03-24 NOTE — PROGRESS NOTES
Late entry.     Patient informed that circumcision completed and no complications noted. Will continue to monitor.     Negrita Tello MD   [facility] - OBGYN     no

## 2024-03-24 NOTE — PROGRESS NOTES
[facility]  Obstetrics and Gynecology    OB/GYN: Postpartum Progress Note     SUBJECTIVE:  Patient is a 28 year old  female who is s/p PCS. She is POD# 3.   Doing well but still uncomfortable and pain from surgery. Denies fever, chills, N, V, chest pain and SOB. Bleeding has been stable.  Voiding without difficulty.  Passing flatus.  denies Bm. Tolerating diet. Ambulating without difficulty.     OBJECTIVE:  Vitals:    24 1945 24 0753 24 0900 24 0905   BP: 135/82 139/90  116/74   Pulse: 78 91  80   Resp: 16 12 16    Temp: 98 °F (36.7 °C) 98.2 °F (36.8 °C)     TempSrc: Oral Oral     Weight:           Physical Exam:  Lungs:   CTAB    Cardiovascular:   RRR      General:    AAA. NAD.    Bowel Sounds:  active   Abdomen Soft, nontender, nondistended, +BS    Lochia:  appropriate   Uterine Fundus:   firm below umbilicus   Incision:  healing well, no significant drainage, no dehiscence, no significant erythema    DVT Evaluation:  No evidence of DVT seen on physical exam.  Negative Fadia's sign.  No cords or calf tenderness.  +1 non-pitting calf/ankle edema.     Urinary output is adequate.   No intake/output data recorded.      Labs:  Recent Labs   Lab 24  0749   RBC 3.09*   HGB 9.0*   HCT 25.6*   MCV 82.8   MCH 29.1   MCHC 35.2   RDW 14.9   NEPRELIM 8.19*   WBC 10.9   .0       ASSESSMENT/PLAN:  Patient is a 28 year old  female who is s/p PCS POD# 3.     Doing well   Continue routine postpartum care  Vitals per routine   Encourage ambulation and IS use   PPH, anemia. S/p Alice, uterotonic medications, suction D&C and 2 units PRBC. IV iron x 2 doses.   Tobacco use, recommend nicotine patches while inpatient   Bilateral edema, ordered Lasix oral x 2 doses  Plan for discharge to home tomorrow, d/w patient and plan to discharge in AM  Follow up in 2-6 weeks        Negrita Tello MD   [facility] - OBGYN      Note to patient and family   The  Century Cures Act makes medical  notes available to patients in the interest of transparency.  However, please be advised that this is a medical document.  It is intended as axbc-ab-hjcv communication.  It is written and medical language may contain abbreviations or verbiage that are technical and unfamiliar.  It may appear blunt or direct.  Medical documents are intended to carry relevant information, facts as evident, and the clinical opinion of the practitioner.

## 2024-03-24 NOTE — DISCHARGE SUMMARY
[facility]  Discharge Summary    Marielena Leroy Patient Status:  inpatient    1995 MRN [id number]   Location -A Attending [facility]   Hosp Day # 3 PCP No primary care provider on file.     Date of Admission: 3/21/2024    Date of Discharge: 24    Admitting Diagnosis: Dichorionic diamniotic twin gestation (HCC)    Discharge Diagnosis:  s/p PCS    Hospital Course: The patient is a 28 year old female now  who was admitted on  at 39wk0d for PCS 2/2 Lizeth twin gestation with fetal malpresentation of twin B. Discussion held with patient about risks, benefits and alternatives to procedure. The patient provided verbal and written consent. S/p PCS on 24. Please refer to operative note for further details. POD#1, the patient was doing well. Her pain was well controlled. POD#2, the patient continued to do well. She was ambulating and voiding without difficulty. She was tolerating a general diet. Pain was well controlled. POD#3, the patient continued to do well and was meeting post operative expectations. Plan for discharge on POD#4 as long as the patient continued to do well. She was discharged to home and instructed to follow up in 2 weeks.     Consultations: none    Procedures: PCS    Complications: none    Discharge Condition: Stable    Discharge Medications: Current Discharge Medication List       Medication List        START taking these medications      docusate sodium 100 MG Caps  Commonly known as: COLACE  Take 100 mg by mouth 2 (two) times daily as needed for constipation.     gabapentin 300 MG Caps  Commonly known as: Neurontin  Take 1 capsule (300 mg total) by mouth every 8 (eight) hours as needed (For breakthrough moderate pain).     ibuprofen 600 MG Tabs  Commonly known as: Motrin  Take 1 tablet (600 mg total) by mouth every 6 (six) hours as needed for Pain.     Naloxone HCl 4 MG/0.1ML Liqd  4 mg by Nasal route as needed. If patient remains unresponsive, repeat dose in  other nostril 2-5 minutes after first dose.     oxyCODONE 10 MG Tabs  Take 1 tablet (10 mg total) by mouth every 6 (six) hours as needed.            CONTINUE taking these medications      prenatal vitamin with DHA 27-0.8-228 MG Caps            STOP taking these medications      pantoprazole 40 MG Tbec  Commonly known as: Protonix     teraconazole 0.4 % Crea  Commonly known as: Terazol               Where to Get Your Medications        These medications were sent to [id number] - [place], [place] - [address] AT [facility] [address], [phone], [phone], [facility] [address]      Phone: [phone]   docusate sodium 100 MG Caps  gabapentin 300 MG Caps  ibuprofen 600 MG Tabs  Naloxone HCl 4 MG/0.1ML Liqd  oxyCODONE 10 MG Tabs           Follow up Visits: Follow-up with [facility] in 2-6weeks      Negrita Tello MD   [facility] - OBGYN      Note to patient and family   The 21st Century Cures Act makes medical notes available to patients in the interest of transparency.  However, please be advised that this is a medical document.  It is intended as lmjt-lw-feiw communication.  It is written and medical language may contain abbreviations or verbiage that are technical and unfamiliar.  It may appear blunt or direct.  Medical documents are intended to carry relevant information, facts as evident, and the clinical opinion of the practitioner.

## 2024-03-25 VITALS
BODY MASS INDEX: 32 KG/M2 | RESPIRATION RATE: 16 BRPM | HEART RATE: 83 BPM | SYSTOLIC BLOOD PRESSURE: 128 MMHG | DIASTOLIC BLOOD PRESSURE: 87 MMHG | TEMPERATURE: 98 F | WEIGHT: 177 LBS

## 2024-03-25 NOTE — PLAN OF CARE
Problem: POSTPARTUM  Goal: Long Term Goal:Experiences normal postpartum course  Description: INTERVENTIONS:  - Assess and monitor vital signs and lab values.  - Assess fundus and lochia.  - Provide ice/sitz baths for perineum discomfort.  - Monitor healing of incision/episiotomy/laceration, and assess for signs and symptoms of infection and hematoma.  - Assess bladder function and monitor for bladder distention.  - Provide/instruct/assist with pericare as needed.  - Provide VTE prophylaxis as needed.  - Monitor bowel function.  - Encourage ambulation and provide assistance as needed.  - Assess and monitor emotional status and provide social service/psych resources as needed.  - Utilize standard precautions and use personal protective equipment as indicated. Ensure aseptic care of all intravenous lines and invasive tubes/drains.  - Obtain immunization and exposure to communicable diseases history.  3/25/2024 0951 by Kristin Tariq, RN  Outcome: Completed  3/25/2024 0935 by Kristin Tariq, RN  Outcome: Progressing  Goal: Optimize infant feeding at the breast  Description: INTERVENTIONS:  - Initiate breast feeding within first hour after birth.   - Monitor effectiveness of current breast feeding efforts.  - Assess support systems available to mother/family.  - Identify cultural beliefs/practices regarding lactation, letdown techniques, maternal food preferences.  - Assess mother's knowledge and previous experience with breast feeding.  - Provide information as needed about early infant feeding cues (e.g., rooting, lip smacking, sucking fingers/hand) versus late cue of crying.  - Discuss/demonstrate breast feeding aids (e.g., infant sling, nursing footstool/pillows, and breast pumps).  - Encourage mother/other family members to express feelings/concerns, and actively listen.  - Educate father/SO about benefits of breast feeding and how to manage common lactation challenges.  - Recommend avoidance of specific  medications or substances incompatible with breast feeding.  - Assess and monitor for signs of nipple pain/trauma.  - Instruct and provide assistance with proper latch.  - Review techniques for milk expression (breast pumping) and storage of breast milk. Provide pumping equipment/supplies, instructions and assistance, as needed.  - Encourage rooming-in and breast feeding on demand.  - Encourage skin-to-skin contact.  - Provide LC support as needed.  - Assess for and manage engorgement.  - Provide breast feeding education handouts and information on community breast feeding support.   3/25/2024 0951 by Kristin Tariq, RN  Outcome: Completed  3/25/2024 0935 by Kristin Tariq RN  Outcome: Progressing  Goal: Establishment of adequate milk supply with medication/procedure interruptions  Description: INTERVENTIONS:  - Review techniques for milk expression (breast pumping).   - Provide pumping equipment/supplies, instructions, and assistance until it is safe to breastfeed infant.  3/25/2024 0951 by Kristin Tariq RN  Outcome: Completed  3/25/2024 0935 by Kristin Tariq RN  Outcome: Progressing  Goal: Appropriate maternal -  bonding  Description: INTERVENTIONS:  - Assess caregiver- interactions.  - Assess caregiver's emotional status and coping mechanisms.  - Encourage caregiver to participate in  daily care.  - Assess support systems available to mother/family.  - Provide /case management support as needed.  3/25/2024 0951 by Kristin Tariq, RN  Outcome: Completed  3/25/2024 0935 by Kristin Tariq RN  Outcome: Progressing

## 2024-03-27 ENCOUNTER — TELEPHONE (OUTPATIENT)
Facility: CLINIC | Age: 29
End: 2024-03-27

## 2024-03-27 ENCOUNTER — TELEPHONE (OUTPATIENT)
Dept: OBGYN UNIT | Facility: HOSPITAL | Age: 29
End: 2024-03-27

## 2024-03-27 DIAGNOSIS — R60.1 GENERALIZED EDEMA: Primary | ICD-10-CM

## 2024-03-27 NOTE — TELEPHONE ENCOUNTER
Pt's feet are swollen since the delivery. The pt requests orders to check her kidney function. Please advice.   Patient is presenting to the Emergency Department with a request to have COVID-19 testing.  Denies symptoms, including cough, shortness of breath, fever, chills, bodyaches or sore throat.

## 2024-03-28 ENCOUNTER — TELEPHONE (OUTPATIENT)
Dept: OBGYN UNIT | Facility: HOSPITAL | Age: 29
End: 2024-03-28

## 2024-03-28 NOTE — TELEPHONE ENCOUNTER
Spoke with pt.  3/21/24. C/O swollen feet & ankles. Advised to elevate, compression stockings, & watch salt intake. Pt has been doing above already. She is requesting a urine test to check her kidney function. Aware I will route to on call provider and call back with recommendations. Verbalized understanding.

## 2024-04-01 PROBLEM — G89.18 POST-OP PAIN: Status: ACTIVE | Noted: 2024-04-01

## 2024-04-02 ENCOUNTER — LAB ENCOUNTER (OUTPATIENT)
Dept: LAB | Age: 29
End: 2024-04-02
Attending: STUDENT IN AN ORGANIZED HEALTH CARE EDUCATION/TRAINING PROGRAM
Payer: COMMERCIAL

## 2024-04-02 DIAGNOSIS — R60.1 GENERALIZED EDEMA: ICD-10-CM

## 2024-04-02 LAB
ALBUMIN SERPL-MCNC: 3.3 G/DL (ref 3.4–5)
ALBUMIN/GLOB SERPL: 0.8 {RATIO} (ref 1–2)
ALP LIVER SERPL-CCNC: 139 U/L
ALT SERPL-CCNC: 43 U/L
ANION GAP SERPL CALC-SCNC: 6 MMOL/L (ref 0–18)
AST SERPL-CCNC: 25 U/L (ref 15–37)
BILIRUB SERPL-MCNC: 0.2 MG/DL (ref 0.1–2)
BUN BLD-MCNC: 14 MG/DL (ref 9–23)
CALCIUM BLD-MCNC: 9.5 MG/DL (ref 8.5–10.1)
CHLORIDE SERPL-SCNC: 108 MMOL/L (ref 98–112)
CO2 SERPL-SCNC: 24 MMOL/L (ref 21–32)
CREAT BLD-MCNC: 0.78 MG/DL
EGFRCR SERPLBLD CKD-EPI 2021: 105 ML/MIN/1.73M2 (ref 60–?)
FASTING STATUS PATIENT QL REPORTED: NO
GLOBULIN PLAS-MCNC: 4.1 G/DL (ref 2.8–4.4)
GLUCOSE BLD-MCNC: 96 MG/DL (ref 70–99)
OSMOLALITY SERPL CALC.SUM OF ELEC: 286 MOSM/KG (ref 275–295)
POTASSIUM SERPL-SCNC: 4.2 MMOL/L (ref 3.5–5.1)
PROT SERPL-MCNC: 7.4 G/DL (ref 6.4–8.2)
SODIUM SERPL-SCNC: 138 MMOL/L (ref 136–145)

## 2024-04-02 PROCEDURE — 80053 COMPREHEN METABOLIC PANEL: CPT

## 2024-04-02 PROCEDURE — 36415 COLL VENOUS BLD VENIPUNCTURE: CPT

## 2024-04-10 ENCOUNTER — POSTPARTUM (OUTPATIENT)
Facility: CLINIC | Age: 29
End: 2024-04-10
Payer: COMMERCIAL

## 2024-04-10 VITALS
DIASTOLIC BLOOD PRESSURE: 64 MMHG | SYSTOLIC BLOOD PRESSURE: 110 MMHG | WEIGHT: 135.81 LBS | HEIGHT: 62 IN | BODY MASS INDEX: 24.99 KG/M2 | HEART RATE: 112 BPM

## 2024-04-10 PROCEDURE — 3078F DIAST BP <80 MM HG: CPT

## 2024-04-10 PROCEDURE — 96127 BRIEF EMOTIONAL/BEHAV ASSMT: CPT

## 2024-04-10 PROCEDURE — 3008F BODY MASS INDEX DOCD: CPT

## 2024-04-10 PROCEDURE — 3074F SYST BP LT 130 MM HG: CPT

## 2024-04-10 RX ORDER — MELATONIN
325
COMMUNITY
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
## 2024-05-01 ENCOUNTER — LAB ENCOUNTER (OUTPATIENT)
Dept: LAB | Age: 29
End: 2024-05-01
Payer: COMMERCIAL

## 2024-05-01 LAB
BASOPHILS # BLD AUTO: 0.03 X10(3) UL (ref 0–0.2)
BASOPHILS NFR BLD AUTO: 0.6 %
EOSINOPHIL # BLD AUTO: 0.26 X10(3) UL (ref 0–0.7)
EOSINOPHIL NFR BLD AUTO: 5 %
ERYTHROCYTE [DISTWIDTH] IN BLOOD BY AUTOMATED COUNT: 12.8 %
HCT VFR BLD AUTO: 35.2 %
HGB BLD-MCNC: 11.5 G/DL
IMM GRANULOCYTES # BLD AUTO: 0.01 X10(3) UL (ref 0–1)
IMM GRANULOCYTES NFR BLD: 0.2 %
LYMPHOCYTES # BLD AUTO: 1.17 X10(3) UL (ref 1–4)
LYMPHOCYTES NFR BLD AUTO: 22.7 %
MCH RBC QN AUTO: 27.9 PG (ref 26–34)
MCHC RBC AUTO-ENTMCNC: 32.7 G/DL (ref 31–37)
MCV RBC AUTO: 85.4 FL
MONOCYTES # BLD AUTO: 0.39 X10(3) UL (ref 0.1–1)
MONOCYTES NFR BLD AUTO: 7.6 %
NEUTROPHILS # BLD AUTO: 3.29 X10 (3) UL (ref 1.5–7.7)
NEUTROPHILS # BLD AUTO: 3.29 X10(3) UL (ref 1.5–7.7)
NEUTROPHILS NFR BLD AUTO: 63.9 %
PLATELET # BLD AUTO: 325 10(3)UL (ref 150–450)
RBC # BLD AUTO: 4.12 X10(6)UL
WBC # BLD AUTO: 5.2 X10(3) UL (ref 4–11)

## 2024-05-01 PROCEDURE — 85025 COMPLETE CBC W/AUTO DIFF WBC: CPT

## 2024-05-01 PROCEDURE — 36415 COLL VENOUS BLD VENIPUNCTURE: CPT

## 2024-05-08 ENCOUNTER — POSTPARTUM (OUTPATIENT)
Facility: CLINIC | Age: 29
End: 2024-05-08
Payer: COMMERCIAL

## 2024-05-08 VITALS
SYSTOLIC BLOOD PRESSURE: 106 MMHG | BODY MASS INDEX: 24 KG/M2 | HEART RATE: 95 BPM | DIASTOLIC BLOOD PRESSURE: 60 MMHG | WEIGHT: 132 LBS

## 2024-05-08 PROCEDURE — 3078F DIAST BP <80 MM HG: CPT | Performed by: OBSTETRICS & GYNECOLOGY

## 2024-05-08 PROCEDURE — 3074F SYST BP LT 130 MM HG: CPT | Performed by: OBSTETRICS & GYNECOLOGY

## 2024-05-08 NOTE — PROGRESS NOTES
HPI    Marielena Leroy is a 28 year old female  here for 6 week post-partum visit.  Patient delivered a  male and female infant on 3/21/24 via primary CSx.  Patient not sure  for contraception.  Patient is bottle feeding.   Patient denies symptoms of depression, [place]  depression scale score of 0 out of 30.     [place]  DEPRESSION SCALE  I have been able to laugh and see the funny side of things: As much as I always could  I have looked forward with enjoyment to things: As much as I ever did  I have been anxious or worried for no good reason: No, not at all  I have felt scared or panicky for no good reason: No, not at all  Things have been getting on top of me: No, I have been coping as well as ever  I have been so unhappy that I have had difficulty sleeping: Not at all  I have felt sad or miserable: No, not at all  I have been so unhappy that I have been crying: No, never  The thought of harming myself has occurred to me: Never  Total: 0    OBSTETRIC HISTORY  OB History    Para Term  AB Living   1 1 1 0 0 2   SAB IAB Ectopic Multiple Live Births   0 0 0 1 2      # Outcome Date GA Lbr Ervin/2nd Weight Sex Type Anes PTL Lv   1A Term 24 39w0d  6 lb 11.2 oz (3.04 kg) F Caesarean Spinal N GREGG      Complications: Other - see comments   1B Term 24 39w0d  6 lb 1.7 oz (2.77 kg) M Caesarean Spinal N GREGG      Obstetric Comments   3/21/24 \"Nicole\" & \"Jose\" - PLTCS for twin B breech. Di-Di twins, fetal growth restriction twin B, diet controlled gestational diabetes, anemia (s/p IV iron x 5 doses (1/3/24-24)), Rh negative, carrier alpha thalassemia, overweight, h/o syphilis       GYNE HISTORY        MEDICATIONS:    Current Outpatient Medications:     ferrous sulfate 325 (65 FE) MG Oral Tab EC, Take 1 tablet (325 mg total) by mouth daily with breakfast., Disp: , Rfl:     docusate sodium 100 MG Oral Cap, Take 100 mg by mouth 2 (two) times daily as needed for  constipation. (Patient not taking: Reported on 4/10/2024), Disp: 30 capsule, Rfl: 0    gabapentin 300 MG Oral Cap, Take 1 capsule (300 mg total) by mouth every 8 (eight) hours as needed (For breakthrough moderate pain). (Patient not taking: Reported on 4/10/2024), Disp: 21 capsule, Rfl: 0    ibuprofen 600 MG Oral Tab, Take 1 tablet (600 mg total) by mouth every 6 (six) hours as needed for Pain. (Patient not taking: Reported on 4/10/2024), Disp: 30 tablet, Rfl: 0    oxyCODONE 10 MG Oral Tab, Take 1 tablet (10 mg total) by mouth every 6 (six) hours as needed. (Patient not taking: Reported on 4/10/2024), Disp: 20 tablet, Rfl: 0    Naloxone HCl 4 MG/0.1ML Nasal Liquid, 4 mg by Nasal route as needed. If patient remains unresponsive, repeat dose in other nostril 2-5 minutes after first dose. (Patient not taking: Reported on 4/10/2024), Disp: 1 kit, Rfl: 0    prenatal vitamin with DHA 27-0.8-228 MG Oral Cap, Take 1 capsule by mouth daily. (Patient not taking: Reported on 4/10/2024), Disp: , Rfl:     ALLERGIES:    Allergies   Allergen Reactions    Amoxicillin HIVES    Ampicillin HIVES and ITCHING    Penicillin G HIVES    Penicillins HIVES and ITCHING    Clindamycin ITCHING     Excessive itchiness    Gentamicin ITCHING     Excessive itchiness       PHYSICAL EXAM  Blood pressure 106/60, pulse 95, weight 132 lb (59.9 kg), last menstrual period 06/22/2023, not currently breastfeeding.  General:  Well nourished, well developed woman in no acute distress  Abdomen:  soft, nontender, no masses  External Genitalia: normal appearance, hair distribution, and no lesions  Vagina:  Normal appearance without lesions, no abnormal discharge  Cervix:  Normal without tenderness on motion  Uterus: normal in size, contour, position, mobility, without tenderness  Adnexa: normal without masses or tenderness  Perineum: well healed perineum  Anus: no hemorroids       ASSESSMENT/PLAN  Marielena was seen today for postpartum care.    Diagnoses and all  orders for this visit:    Postpartum exam (HCC)      Discussed all options of birthcontrol including ocps, minipill, Mirena or Paragard IUD, nuvaring, orthoevra patch, nexplanon, Depoprovera, condoms or tubal sterilization options. Patient has chosen condom.  Patient to return for annual gyne exam in November.

## 2024-10-01 DIAGNOSIS — O36.80X1: Primary | ICD-10-CM

## 2024-10-09 ENCOUNTER — ULTRASOUND ENCOUNTER (OUTPATIENT)
Facility: CLINIC | Age: 29
End: 2024-10-09
Payer: MEDICAID

## 2024-10-09 ENCOUNTER — INITIAL PRENATAL (OUTPATIENT)
Facility: CLINIC | Age: 29
End: 2024-10-09
Payer: MEDICAID

## 2024-10-09 ENCOUNTER — TELEPHONE (OUTPATIENT)
Facility: CLINIC | Age: 29
End: 2024-10-09

## 2024-10-09 ENCOUNTER — LAB ENCOUNTER (OUTPATIENT)
Dept: LAB | Age: 29
End: 2024-10-09
Payer: MEDICAID

## 2024-10-09 VITALS
HEIGHT: 62 IN | WEIGHT: 132 LBS | DIASTOLIC BLOOD PRESSURE: 64 MMHG | BODY MASS INDEX: 24.29 KG/M2 | SYSTOLIC BLOOD PRESSURE: 118 MMHG | HEART RATE: 88 BPM

## 2024-10-09 DIAGNOSIS — Z86.32 HISTORY OF DIET CONTROLLED GESTATIONAL DIABETES MELLITUS (GDM): ICD-10-CM

## 2024-10-09 DIAGNOSIS — Z13.79 GENETIC SCREENING: ICD-10-CM

## 2024-10-09 DIAGNOSIS — Z3A.11 11 WEEKS GESTATION OF PREGNANCY (HCC): ICD-10-CM

## 2024-10-09 DIAGNOSIS — Z3A.11 11 WEEKS GESTATION OF PREGNANCY (HCC): Primary | ICD-10-CM

## 2024-10-09 DIAGNOSIS — R69 DIAGNOSIS UNKNOWN: Primary | ICD-10-CM

## 2024-10-09 PROBLEM — Z3A.39 39 WEEKS GESTATION OF PREGNANCY (HCC): Status: RESOLVED | Noted: 2024-03-21 | Resolved: 2024-10-09

## 2024-10-09 PROBLEM — O24.410 DIET CONTROLLED GESTATIONAL DIABETES MELLITUS (GDM) IN THIRD TRIMESTER (HCC): Status: RESOLVED | Noted: 2024-01-03 | Resolved: 2024-10-09

## 2024-10-09 PROBLEM — G89.18 POST-OP PAIN: Status: RESOLVED | Noted: 2024-04-01 | Resolved: 2024-10-09

## 2024-10-09 PROBLEM — Z67.91 RH NEGATIVE STATUS DURING PREGNANCY IN THIRD TRIMESTER (HCC): Status: RESOLVED | Noted: 2023-09-16 | Resolved: 2024-10-09

## 2024-10-09 PROBLEM — E66.3 OVERWEIGHT (BMI 25.0-29.9): Status: RESOLVED | Noted: 2024-01-03 | Resolved: 2024-10-09

## 2024-10-09 PROBLEM — O26.893 RH NEGATIVE STATUS DURING PREGNANCY IN THIRD TRIMESTER (HCC): Status: RESOLVED | Noted: 2023-09-16 | Resolved: 2024-10-09

## 2024-10-09 LAB
ANTIBODY SCREEN: NEGATIVE
APPEARANCE: CLEAR
BASOPHILS # BLD AUTO: 0.02 X10(3) UL (ref 0–0.2)
BASOPHILS NFR BLD AUTO: 0.3 %
BILIRUBIN: NEGATIVE
DEPRECATED HBV CORE AB SER IA-ACNC: 41 NG/ML
EOSINOPHIL # BLD AUTO: 0.07 X10(3) UL (ref 0–0.7)
EOSINOPHIL NFR BLD AUTO: 0.9 %
ERYTHROCYTE [DISTWIDTH] IN BLOOD BY AUTOMATED COUNT: 13.5 %
EST. AVERAGE GLUCOSE BLD GHB EST-MCNC: 97 MG/DL (ref 68–126)
GLUCOSE (URINE DIPSTICK): NEGATIVE MG/DL
HBA1C MFR BLD: 5 % (ref ?–5.7)
HBV SURFACE AG SER-ACNC: <0.1 [IU]/L
HBV SURFACE AG SERPL QL IA: NONREACTIVE
HCT VFR BLD AUTO: 40 %
HCV AB SERPL QL IA: NONREACTIVE
HGB BLD-MCNC: 13.2 G/DL
IMM GRANULOCYTES # BLD AUTO: 0.04 X10(3) UL (ref 0–1)
IMM GRANULOCYTES NFR BLD: 0.5 %
KETONES (URINE DIPSTICK): NEGATIVE MG/DL
LEUKOCYTES: NEGATIVE
LYMPHOCYTES # BLD AUTO: 1.24 X10(3) UL (ref 1–4)
LYMPHOCYTES NFR BLD AUTO: 15.7 %
MCH RBC QN AUTO: 27.5 PG (ref 26–34)
MCHC RBC AUTO-ENTMCNC: 33 G/DL (ref 31–37)
MCV RBC AUTO: 83.3 FL
MONOCYTES # BLD AUTO: 0.37 X10(3) UL (ref 0.1–1)
MONOCYTES NFR BLD AUTO: 4.7 %
MULTISTIX LOT#: NORMAL NUMERIC
NEUTROPHILS # BLD AUTO: 6.18 X10 (3) UL (ref 1.5–7.7)
NEUTROPHILS # BLD AUTO: 6.18 X10(3) UL (ref 1.5–7.7)
NEUTROPHILS NFR BLD AUTO: 77.9 %
NITRITE, URINE: NEGATIVE
OCCULT BLOOD: NEGATIVE
PH, URINE: 6.5 (ref 4.5–8)
PLATELET # BLD AUTO: 298 10(3)UL (ref 150–450)
PROTEIN (URINE DIPSTICK): NEGATIVE MG/DL
RBC # BLD AUTO: 4.8 X10(6)UL
RH BLOOD TYPE: NEGATIVE
RUBV IGG SER QL: POSITIVE
RUBV IGG SER-ACNC: 162 IU/ML (ref 10–?)
SPECIFIC GRAVITY: 1.01 (ref 1–1.03)
T PALLIDUM AB SER QL IA: REACTIVE
URINE-COLOR: YELLOW
UROBILINOGEN,SEMI-QN: 0.2 MG/DL (ref 0–1.9)
WBC # BLD AUTO: 7.9 X10(3) UL (ref 4–11)

## 2024-10-09 PROCEDURE — 83036 HEMOGLOBIN GLYCOSYLATED A1C: CPT

## 2024-10-09 PROCEDURE — 86592 SYPHILIS TEST NON-TREP QUAL: CPT

## 2024-10-09 PROCEDURE — 86803 HEPATITIS C AB TEST: CPT

## 2024-10-09 PROCEDURE — 87389 HIV-1 AG W/HIV-1&-2 AB AG IA: CPT

## 2024-10-09 PROCEDURE — 85025 COMPLETE CBC W/AUTO DIFF WBC: CPT

## 2024-10-09 PROCEDURE — 87186 SC STD MICRODIL/AGAR DIL: CPT

## 2024-10-09 PROCEDURE — 87591 N.GONORRHOEAE DNA AMP PROB: CPT

## 2024-10-09 PROCEDURE — 82728 ASSAY OF FERRITIN: CPT

## 2024-10-09 PROCEDURE — 87491 CHLMYD TRACH DNA AMP PROBE: CPT

## 2024-10-09 PROCEDURE — 87086 URINE CULTURE/COLONY COUNT: CPT

## 2024-10-09 PROCEDURE — 36415 COLL VENOUS BLD VENIPUNCTURE: CPT

## 2024-10-09 PROCEDURE — 86900 BLOOD TYPING SEROLOGIC ABO: CPT

## 2024-10-09 PROCEDURE — 86780 TREPONEMA PALLIDUM: CPT

## 2024-10-09 PROCEDURE — 86762 RUBELLA ANTIBODY: CPT

## 2024-10-09 PROCEDURE — 0500F INITIAL PRENATAL CARE VISIT: CPT

## 2024-10-09 PROCEDURE — 86901 BLOOD TYPING SEROLOGIC RH(D): CPT

## 2024-10-09 PROCEDURE — 81002 URINALYSIS NONAUTO W/O SCOPE: CPT

## 2024-10-09 PROCEDURE — 86850 RBC ANTIBODY SCREEN: CPT

## 2024-10-09 PROCEDURE — 87077 CULTURE AEROBIC IDENTIFY: CPT

## 2024-10-09 PROCEDURE — 87340 HEPATITIS B SURFACE AG IA: CPT

## 2024-10-09 RX ORDER — FOLIC ACID 1 MG/1
TABLET ORAL DAILY
COMMUNITY

## 2024-10-09 NOTE — TELEPHONE ENCOUNTER
11   [facility] pregnancy    [facility] NIPS lab draw per Ernestina's order.    Patient completed NIPT at the lab downstairs.   Specimen tubes name/ labeled verified with patient  Specimen placed in  to order  Discussed to call office in 1-2 weeks for results, results/gender information will be sent in her [facility] portal.  Patient verbalized understanding, agreed to and intend to comply with plan of care.
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
## 2024-10-13 RX ORDER — NITROFURANTOIN 25; 75 MG/1; MG/1
100 CAPSULE ORAL 2 TIMES DAILY
Qty: 14 CAPSULE | Refills: 0 | Status: SHIPPED | OUTPATIENT
Start: 2024-10-13 | End: 2024-10-20

## 2024-10-14 LAB
TREPONEMAL ANTIBODIES, TPPA: REACTIVE
TREPONEMAL ANTIBODIES, TPPA: REACTIVE

## 2024-10-14 NOTE — PROGRESS NOTES
Spoke with patient. Reviewed results and recommendations:     T Pal reactive, RPR neg - she was treated previously with PCN G, no active infection.    Ferritin low - advise extra iron (M,W,F)    RH neg - will need rhogam at 28 weeks    All other labs are normal    Understanding verbalized.

## 2024-11-12 ENCOUNTER — LAB ENCOUNTER (OUTPATIENT)
Dept: LAB | Age: 29
End: 2024-11-12
Attending: STUDENT IN AN ORGANIZED HEALTH CARE EDUCATION/TRAINING PROGRAM
Payer: MEDICAID

## 2024-11-12 ENCOUNTER — ROUTINE PRENATAL (OUTPATIENT)
Facility: CLINIC | Age: 29
End: 2024-11-12
Payer: MEDICAID

## 2024-11-12 VITALS
SYSTOLIC BLOOD PRESSURE: 100 MMHG | HEIGHT: 62 IN | BODY MASS INDEX: 25.18 KG/M2 | HEART RATE: 99 BPM | DIASTOLIC BLOOD PRESSURE: 56 MMHG | WEIGHT: 136.81 LBS

## 2024-11-12 DIAGNOSIS — Z3A.16 16 WEEKS GESTATION OF PREGNANCY (HCC): Primary | ICD-10-CM

## 2024-11-12 DIAGNOSIS — Z3A.16 16 WEEKS GESTATION OF PREGNANCY (HCC): ICD-10-CM

## 2024-11-12 LAB
BASOPHILS # BLD AUTO: 0.03 X10(3) UL (ref 0–0.2)
BASOPHILS NFR BLD AUTO: 0.4 %
EOSINOPHIL # BLD AUTO: 0.21 X10(3) UL (ref 0–0.7)
EOSINOPHIL NFR BLD AUTO: 2.8 %
ERYTHROCYTE [DISTWIDTH] IN BLOOD BY AUTOMATED COUNT: 13.2 %
HCT VFR BLD AUTO: 36.2 %
HGB BLD-MCNC: 11.7 G/DL
IMM GRANULOCYTES # BLD AUTO: 0.03 X10(3) UL (ref 0–1)
IMM GRANULOCYTES NFR BLD: 0.4 %
LYMPHOCYTES # BLD AUTO: 1.24 X10(3) UL (ref 1–4)
LYMPHOCYTES NFR BLD AUTO: 16.8 %
MCH RBC QN AUTO: 27.3 PG (ref 26–34)
MCHC RBC AUTO-ENTMCNC: 32.3 G/DL (ref 31–37)
MCV RBC AUTO: 84.6 FL
MONOCYTES # BLD AUTO: 0.43 X10(3) UL (ref 0.1–1)
MONOCYTES NFR BLD AUTO: 5.8 %
NEUTROPHILS # BLD AUTO: 5.44 X10 (3) UL (ref 1.5–7.7)
NEUTROPHILS # BLD AUTO: 5.44 X10(3) UL (ref 1.5–7.7)
NEUTROPHILS NFR BLD AUTO: 73.8 %
PLATELET # BLD AUTO: 246 10(3)UL (ref 150–450)
RBC # BLD AUTO: 4.28 X10(6)UL
WBC # BLD AUTO: 7.4 X10(3) UL (ref 4–11)

## 2024-11-12 PROCEDURE — 82105 ALPHA-FETOPROTEIN SERUM: CPT

## 2024-11-12 PROCEDURE — 36415 COLL VENOUS BLD VENIPUNCTURE: CPT

## 2024-11-12 PROCEDURE — 99213 OFFICE O/P EST LOW 20 MIN: CPT | Performed by: STUDENT IN AN ORGANIZED HEALTH CARE EDUCATION/TRAINING PROGRAM

## 2024-11-12 PROCEDURE — 85025 COMPLETE CBC W/AUTO DIFF WBC: CPT
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
## 2024-11-14 LAB
AFP MOM: 1.8
AFP VALUE: 63.4 NG/ML
GA ON COLL DATE: 16 WEEKS
INSULIN DEP AFP: NO
MAT AGE AT EDD: 31.7 YR
MULTIPLE GEST AFP: NO
OSBR RISK 1 IN AFP: 1278
WEIGHT AFP: 136 LBS

## 2024-11-14 NOTE — PROGRESS NOTES
Unable to leave a message. Voicemail not set up.    Pt with straight Medicaid. No referrals needed for IV iron. Will fax order once signed.

## 2024-11-15 NOTE — PROGRESS NOTES
Pt aware of results & recommendations for IV iron. Order to be faxed to the cancer center once signed. Scheduling number given to call next week if they have not call her to schedule. Verbalized understanding.

## 2024-12-12 ENCOUNTER — ULTRASOUND ENCOUNTER (OUTPATIENT)
Facility: CLINIC | Age: 29
End: 2024-12-12
Payer: MEDICAID

## 2024-12-12 ENCOUNTER — APPOINTMENT (OUTPATIENT)
Dept: HEMATOLOGY/ONCOLOGY | Facility: HOSPITAL | Age: 29
End: 2024-12-12
Attending: OBSTETRICS & GYNECOLOGY
Payer: MEDICAID

## 2024-12-12 ENCOUNTER — ROUTINE PRENATAL (OUTPATIENT)
Facility: CLINIC | Age: 29
End: 2024-12-12
Payer: MEDICAID

## 2024-12-12 VITALS
HEART RATE: 91 BPM | SYSTOLIC BLOOD PRESSURE: 104 MMHG | DIASTOLIC BLOOD PRESSURE: 60 MMHG | WEIGHT: 141.63 LBS | BODY MASS INDEX: 26.06 KG/M2 | HEIGHT: 62 IN

## 2024-12-12 DIAGNOSIS — Z3A.16 16 WEEKS GESTATION OF PREGNANCY (HCC): ICD-10-CM

## 2024-12-12 DIAGNOSIS — Z34.82 PRENATAL CARE, SUBSEQUENT PREGNANCY IN SECOND TRIMESTER (HCC): Primary | ICD-10-CM

## 2024-12-12 DIAGNOSIS — Z3A.20 20 WEEKS GESTATION OF PREGNANCY (HCC): ICD-10-CM

## 2024-12-12 DIAGNOSIS — Z36.2 ENCOUNTER FOR FOLLOW-UP ULTRASOUND OF FETAL ANATOMY (HCC): ICD-10-CM

## 2024-12-12 PROCEDURE — 76805 OB US >/= 14 WKS SNGL FETUS: CPT | Performed by: OBSTETRICS & GYNECOLOGY

## 2024-12-12 PROCEDURE — 99214 OFFICE O/P EST MOD 30 MIN: CPT | Performed by: OBSTETRICS & GYNECOLOGY
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
## 2024-12-16 ENCOUNTER — OFFICE VISIT (OUTPATIENT)
Age: 29
End: 2024-12-16
Attending: OBSTETRICS & GYNECOLOGY
Payer: MEDICAID

## 2024-12-16 VITALS
HEIGHT: 62.01 IN | BODY MASS INDEX: 26.2 KG/M2 | DIASTOLIC BLOOD PRESSURE: 66 MMHG | TEMPERATURE: 98 F | SYSTOLIC BLOOD PRESSURE: 102 MMHG | RESPIRATION RATE: 14 BRPM | OXYGEN SATURATION: 97 % | HEART RATE: 98 BPM | WEIGHT: 144.19 LBS

## 2024-12-16 DIAGNOSIS — D50.9 MATERNAL IRON DEFICIENCY ANEMIA AFFECTING PREGNANCY IN THIRD TRIMESTER, ANTEPARTUM (HCC): Primary | ICD-10-CM

## 2024-12-16 DIAGNOSIS — O99.013 MATERNAL IRON DEFICIENCY ANEMIA AFFECTING PREGNANCY IN THIRD TRIMESTER, ANTEPARTUM (HCC): Primary | ICD-10-CM

## 2024-12-16 NOTE — PROGRESS NOTES
Education Record    Learner:  Patient    Pt here for: venofer    Barriers / Limitations:  None    Method:  Brief focused, printed material and  reinforcement    General Topics:  Plan of care reviewed    Outcome: Pt tolerated infusion with no c/o. Left in stable condition.

## 2024-12-18 ENCOUNTER — OFFICE VISIT (OUTPATIENT)
Age: 29
End: 2024-12-18
Attending: OBSTETRICS & GYNECOLOGY
Payer: MEDICAID

## 2024-12-18 DIAGNOSIS — D50.9 MATERNAL IRON DEFICIENCY ANEMIA AFFECTING PREGNANCY IN THIRD TRIMESTER, ANTEPARTUM (HCC): Primary | ICD-10-CM

## 2024-12-18 DIAGNOSIS — O99.013 MATERNAL IRON DEFICIENCY ANEMIA AFFECTING PREGNANCY IN THIRD TRIMESTER, ANTEPARTUM (HCC): Primary | ICD-10-CM

## 2024-12-18 NOTE — PROGRESS NOTES
Education Record    Learner:  Patient    Pt here for: IV venofer    Barriers / Limitations:  None    Method:  Brief focused, printed material and  reinforcement    General Topics:  Plan of care reviewed    Outcome: Pt tolerated infusion with no c/o.

## 2024-12-20 ENCOUNTER — OFFICE VISIT (OUTPATIENT)
Age: 29
End: 2024-12-20
Attending: OBSTETRICS & GYNECOLOGY
Payer: MEDICAID

## 2024-12-20 VITALS
SYSTOLIC BLOOD PRESSURE: 111 MMHG | HEART RATE: 97 BPM | TEMPERATURE: 98 F | DIASTOLIC BLOOD PRESSURE: 63 MMHG | RESPIRATION RATE: 16 BRPM | OXYGEN SATURATION: 100 %

## 2024-12-20 DIAGNOSIS — O99.013 MATERNAL IRON DEFICIENCY ANEMIA AFFECTING PREGNANCY IN THIRD TRIMESTER, ANTEPARTUM (HCC): Primary | ICD-10-CM

## 2024-12-20 DIAGNOSIS — D50.9 MATERNAL IRON DEFICIENCY ANEMIA AFFECTING PREGNANCY IN THIRD TRIMESTER, ANTEPARTUM (HCC): Primary | ICD-10-CM

## 2024-12-20 NOTE — PROGRESS NOTES
Pt here for Venofer . Pt denies any issues or concerns.      Ordering Provider: Maria Elena   Order Exp: 2 doses remain      Pt tolerated infusion without difficulty or complaint. Reviewed next apt date/time: yes      Education Record  Learner:  Patient  Disease / Diagnosis: iron deficiency   Barriers / Limitations:  None  Method:  Discussion  General Topics:  Plan of care reviewed  Outcome:  Shows understanding     Patient here for IV  iron - tolerated without issue and left in stable condition. Future appointments in place.

## 2024-12-23 ENCOUNTER — OFFICE VISIT (OUTPATIENT)
Age: 29
End: 2024-12-23
Attending: OBSTETRICS & GYNECOLOGY
Payer: MEDICAID

## 2024-12-23 VITALS
SYSTOLIC BLOOD PRESSURE: 107 MMHG | RESPIRATION RATE: 16 BRPM | WEIGHT: 148.5 LBS | DIASTOLIC BLOOD PRESSURE: 67 MMHG | HEART RATE: 110 BPM | HEIGHT: 62.01 IN | OXYGEN SATURATION: 99 % | BODY MASS INDEX: 26.98 KG/M2 | TEMPERATURE: 98 F

## 2024-12-23 DIAGNOSIS — D50.9 MATERNAL IRON DEFICIENCY ANEMIA AFFECTING PREGNANCY IN THIRD TRIMESTER, ANTEPARTUM (HCC): Primary | ICD-10-CM

## 2024-12-23 DIAGNOSIS — O99.013 MATERNAL IRON DEFICIENCY ANEMIA AFFECTING PREGNANCY IN THIRD TRIMESTER, ANTEPARTUM (HCC): Primary | ICD-10-CM

## 2024-12-23 NOTE — PROGRESS NOTES
Pt here for Venofer . Pt denies any issues or concerns.      Ordering Provider: Dr. Arredondo  Order Exp: 5 doses ordered - today is #4 of 5     Pt tolerated infusion without difficulty or complaint. Reviewed next apt date/time: Yes - 12/26/24 at 2:30 PM      Education Record  Learner:  Patient  Disease / Diagnosis: Maternal Iron Deficiency anemia affecting pregnancy  Barriers / Limitations:  None  Method:  Brief focused and Reinforcement  General Topics:  Medication, Side effects and symptom management, and Plan of care reviewed  Outcome:  Shows understanding

## 2024-12-26 ENCOUNTER — ULTRASOUND ENCOUNTER (OUTPATIENT)
Facility: CLINIC | Age: 29
End: 2024-12-26
Payer: MEDICAID

## 2024-12-26 ENCOUNTER — OFFICE VISIT (OUTPATIENT)
Age: 29
End: 2024-12-26
Attending: OBSTETRICS & GYNECOLOGY
Payer: MEDICAID

## 2024-12-26 VITALS
SYSTOLIC BLOOD PRESSURE: 105 MMHG | DIASTOLIC BLOOD PRESSURE: 70 MMHG | HEART RATE: 98 BPM | RESPIRATION RATE: 16 BRPM | TEMPERATURE: 98 F | OXYGEN SATURATION: 99 %

## 2024-12-26 DIAGNOSIS — O99.013 MATERNAL IRON DEFICIENCY ANEMIA AFFECTING PREGNANCY IN THIRD TRIMESTER, ANTEPARTUM (HCC): Primary | ICD-10-CM

## 2024-12-26 DIAGNOSIS — D50.9 MATERNAL IRON DEFICIENCY ANEMIA AFFECTING PREGNANCY IN THIRD TRIMESTER, ANTEPARTUM (HCC): Primary | ICD-10-CM

## 2024-12-26 NOTE — PROGRESS NOTES
Education Record    Learner:  Patient    Disease / Diagnosis: venofer inf, CHRIS in pregnancy    Barriers / Limitations:  None   Comments:    Method:  Brief focused   Comments:    General Topics:  Plan of care reviewed   Comments:    Outcome:  Shows understanding   Comments:    Tolerated well without issue. Last venofer of order.

## 2025-01-07 ENCOUNTER — TELEPHONE (OUTPATIENT)
Facility: CLINIC | Age: 30
End: 2025-01-07

## 2025-01-07 DIAGNOSIS — E61.1 IRON DEFICIENCY: ICD-10-CM

## 2025-01-07 DIAGNOSIS — Z92.89 HISTORY OF TRANSFUSION OF PACKED RED BLOOD CELLS: ICD-10-CM

## 2025-01-07 DIAGNOSIS — O09.292 HISTORY OF GESTATIONAL DIABETES IN PRIOR PREGNANCY, CURRENTLY PREGNANT IN SECOND TRIMESTER (HCC): ICD-10-CM

## 2025-01-07 DIAGNOSIS — O34.219 HISTORY OF CESAREAN DELIVERY AFFECTING PREGNANCY (HCC): ICD-10-CM

## 2025-01-07 DIAGNOSIS — O44.02: Primary | ICD-10-CM

## 2025-01-07 DIAGNOSIS — Z86.32 HISTORY OF GESTATIONAL DIABETES IN PRIOR PREGNANCY, CURRENTLY PREGNANT IN SECOND TRIMESTER (HCC): ICD-10-CM

## 2025-01-07 DIAGNOSIS — D56.3 ALPHA THALASSEMIA TRAIT: ICD-10-CM

## 2025-01-07 DIAGNOSIS — O09.292 HISTORY OF POSTPARTUM HEMORRHAGE, CURRENTLY PREGNANT IN SECOND TRIMESTER (HCC): ICD-10-CM

## 2025-01-07 DIAGNOSIS — Z87.898 HISTORY OF POOR FETAL GROWTH: ICD-10-CM

## 2025-01-07 DIAGNOSIS — Z86.19 HISTORY OF SYPHILIS: ICD-10-CM

## 2025-01-07 DIAGNOSIS — O09.892 SHORT INTERVAL BETWEEN PREGNANCIES AFFECTING PREGNANCY IN SECOND TRIMESTER, ANTEPARTUM (HCC): ICD-10-CM

## 2025-01-07 PROBLEM — D50.9 MATERNAL IRON DEFICIENCY ANEMIA AFFECTING PREGNANCY IN THIRD TRIMESTER, ANTEPARTUM (HCC): Status: RESOLVED | Noted: 2023-12-21 | Resolved: 2025-01-07

## 2025-01-07 PROBLEM — O30.049 DICHORIONIC DIAMNIOTIC TWIN GESTATION (HCC): Status: RESOLVED | Noted: 2024-03-21 | Resolved: 2025-01-07

## 2025-01-07 PROBLEM — O36.5930 POOR FETAL GROWTH AFFECTING MANAGEMENT OF MOTHER IN THIRD TRIMESTER (HCC): Status: RESOLVED | Noted: 2024-02-20 | Resolved: 2025-01-07

## 2025-01-07 PROBLEM — O99.013 MATERNAL IRON DEFICIENCY ANEMIA AFFECTING PREGNANCY IN THIRD TRIMESTER, ANTEPARTUM (HCC): Status: RESOLVED | Noted: 2023-12-21 | Resolved: 2025-01-07

## 2025-01-07 PROBLEM — Z98.891 HISTORY OF CESAREAN SECTION: Status: ACTIVE | Noted: 2024-03-21

## 2025-01-07 PROBLEM — O35.BXX1 ECHOGENIC FOCUS OF HEART OF FETUS AFFECTING ANTEPARTUM CARE OF MOTHER, FETUS 1 (HCC): Status: ACTIVE | Noted: 2025-01-07

## 2025-01-07 PROBLEM — O30.009 TWIN DELIVERY BY C-SECTION (HCC): Status: RESOLVED | Noted: 2024-03-21 | Resolved: 2025-01-07

## 2025-01-07 PROBLEM — O30.043 DICHORIONIC DIAMNIOTIC TWIN PREGNANCY IN THIRD TRIMESTER (HCC): Status: RESOLVED | Noted: 2023-09-12 | Resolved: 2025-01-07

## 2025-01-07 PROBLEM — Z67.91 RH NEGATIVE STATUS DURING PREGNANCY IN SECOND TRIMESTER (HCC): Status: ACTIVE | Noted: 2025-01-07

## 2025-01-07 PROBLEM — O26.892 RH NEGATIVE STATUS DURING PREGNANCY IN SECOND TRIMESTER (HCC): Status: ACTIVE | Noted: 2025-01-07

## 2025-01-07 PROBLEM — Z28.21 TETANUS, DIPHTHERIA, AND ACELLULAR PERTUSSIS (TDAP) VACCINATION DECLINED: Status: RESOLVED | Noted: 2024-01-03 | Resolved: 2025-01-07

## 2025-01-07 PROBLEM — Z98.890 STATUS POST DILATION AND CURETTAGE: Status: RESOLVED | Noted: 2024-03-21 | Resolved: 2025-01-07

## 2025-01-07 NOTE — PATIENT INSTRUCTIONS
See MFM as soon as possible to look at placental location & investigate for possible placenta accreta.     1 hr glucose tolerance test, hemoglobin A1c, CBC - sometime in the next few weeks.     Rhogam injection at next visit.     Consider Tdap       Magnesium glycinate 500 mg nightly       Tdap (Tetanus, Diptheria, Pertussis)   -booster shot for pertussis (whooping cough)  -recommended by the CDC (Centers for Disease Control) for every pregnant woman in the third trimester (28 weeks and beyond)  -the purpose of giving the vaccine during pregnancy is so that the mother can share her antibodies with the fetus across the placenta  -it is recommended to take this vaccine early in the third trimester so that your body has enough time to produce the antibodies that can pass across the placenta to the fetus  -the infant cannot be vaccinated for pertussis until 2 months of age due to an immature immune system and lack of response to the vaccine prior to that time.   -the father of the baby as well as grandparents, other caregivers, etc should receive a booster shot for whooping cough as well (vaccination at least 1 time after the age of 18 is sufficient)     Maternal Fetal Medicine (MFM)  Marleni Avilez Taylor, [place]   [address], [address]   Ph [phone]    [address]  [phone]    Tree Guillen  [address], [address]  Ph [phone]    [address], [address]  Ph [phone]
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
## 2025-01-08 ENCOUNTER — TELEPHONE (OUTPATIENT)
Dept: PERINATAL CARE | Facility: HOSPITAL | Age: 30
End: 2025-01-08

## 2025-01-08 ENCOUNTER — TELEPHONE (OUTPATIENT)
Facility: CLINIC | Age: 30
End: 2025-01-08

## 2025-01-08 ENCOUNTER — ROUTINE PRENATAL (OUTPATIENT)
Facility: CLINIC | Age: 30
End: 2025-01-08
Payer: MEDICAID

## 2025-01-08 VITALS
DIASTOLIC BLOOD PRESSURE: 60 MMHG | SYSTOLIC BLOOD PRESSURE: 124 MMHG | HEIGHT: 62 IN | WEIGHT: 148.19 LBS | HEART RATE: 117 BPM | BODY MASS INDEX: 27.27 KG/M2

## 2025-01-08 DIAGNOSIS — Z86.32 HISTORY OF GESTATIONAL DIABETES IN PRIOR PREGNANCY, CURRENTLY PREGNANT IN SECOND TRIMESTER (HCC): ICD-10-CM

## 2025-01-08 DIAGNOSIS — O09.892 SHORT INTERVAL BETWEEN PREGNANCIES AFFECTING PREGNANCY IN SECOND TRIMESTER, ANTEPARTUM (HCC): ICD-10-CM

## 2025-01-08 DIAGNOSIS — O44.12: Primary | ICD-10-CM

## 2025-01-08 DIAGNOSIS — D56.3 ALPHA THALASSEMIA TRAIT: ICD-10-CM

## 2025-01-08 DIAGNOSIS — Z86.19 HISTORY OF SYPHILIS: ICD-10-CM

## 2025-01-08 DIAGNOSIS — Z13.1 SCREENING FOR DIABETES MELLITUS: ICD-10-CM

## 2025-01-08 DIAGNOSIS — Z92.89 HISTORY OF TRANSFUSION OF PACKED RED BLOOD CELLS: ICD-10-CM

## 2025-01-08 DIAGNOSIS — Z13.0 SCREENING, ANEMIA, DEFICIENCY, IRON: Primary | ICD-10-CM

## 2025-01-08 DIAGNOSIS — O34.219 HISTORY OF CESAREAN DELIVERY AFFECTING PREGNANCY (HCC): ICD-10-CM

## 2025-01-08 DIAGNOSIS — Z28.21 INFLUENZA VACCINATION DECLINED BY PATIENT: ICD-10-CM

## 2025-01-08 DIAGNOSIS — O09.292 HISTORY OF POSTPARTUM HEMORRHAGE, CURRENTLY PREGNANT IN SECOND TRIMESTER (HCC): ICD-10-CM

## 2025-01-08 DIAGNOSIS — Z13.0 SCREENING, ANEMIA, DEFICIENCY, IRON: ICD-10-CM

## 2025-01-08 DIAGNOSIS — Z87.898 HISTORY OF POOR FETAL GROWTH: ICD-10-CM

## 2025-01-08 DIAGNOSIS — O35.BXX1: ICD-10-CM

## 2025-01-08 DIAGNOSIS — O09.292 HISTORY OF GESTATIONAL DIABETES IN PRIOR PREGNANCY, CURRENTLY PREGNANT IN SECOND TRIMESTER (HCC): ICD-10-CM

## 2025-01-08 DIAGNOSIS — E61.1 IRON DEFICIENCY: ICD-10-CM

## 2025-01-08 DIAGNOSIS — Z67.91 RH NEGATIVE STATUS DURING PREGNANCY IN SECOND TRIMESTER (HCC): ICD-10-CM

## 2025-01-08 DIAGNOSIS — O26.892 RH NEGATIVE STATUS DURING PREGNANCY IN SECOND TRIMESTER (HCC): ICD-10-CM

## 2025-01-08 PROCEDURE — 99214 OFFICE O/P EST MOD 30 MIN: CPT | Performed by: OBSTETRICS & GYNECOLOGY

## 2025-01-08 NOTE — TELEPHONE ENCOUNTER
Patient needs her orders changed to [facility]. She is asking that a copy of the order be sent to her MyChart so she can print them.  She wants to have a physical copy

## 2025-01-26 LAB
ABSOLUTE BASOPHILS: 19 CELLS/UL (ref 0–200)
ABSOLUTE EOSINOPHILS: 223 CELLS/UL (ref 15–500)
ABSOLUTE LYMPHOCYTES: 1183 CELLS/UL (ref 850–3900)
ABSOLUTE MONOCYTES: 427 CELLS/UL (ref 200–950)
ABSOLUTE NEUTROPHILS: 7847 CELLS/UL (ref 1500–7800)
BASOPHILS: 0.2 %
EOSINOPHILS: 2.3 %
GLUCOSE, GESTATIONAL SCREEN (50G)-130 CUTOFF: 123 MG/DL
HEMATOCRIT: 36.5 % (ref 35–45)
HEMOGLOBIN A1C: 5 % OF TOTAL HGB
HEMOGLOBIN: 11.6 G/DL (ref 11.7–15.5)
LYMPHOCYTES: 12.2 %
MCH: 27.5 PG (ref 27–33)
MCHC: 31.8 G/DL (ref 32–36)
MCV: 86.5 FL (ref 80–100)
MONOCYTES: 4.4 %
MPV: 11.2 FL (ref 7.5–12.5)
NEUTROPHILS: 80.9 %
PLATELET COUNT: 247 THOUSAND/UL (ref 140–400)
RDW: 13 % (ref 11–15)
RED BLOOD CELL COUNT: 4.22 MILLION/UL (ref 3.8–5.1)
WHITE BLOOD CELL COUNT: 9.7 THOUSAND/UL (ref 3.8–10.8)

## 2025-02-03 ENCOUNTER — TELEPHONE (OUTPATIENT)
Dept: PERINATAL CARE | Facility: HOSPITAL | Age: 30
End: 2025-02-03

## 2025-02-03 ENCOUNTER — TELEPHONE (OUTPATIENT)
Facility: CLINIC | Age: 30
End: 2025-02-03

## 2025-02-03 NOTE — TELEPHONE ENCOUNTER
[facility] does not take her insurance. Advised pt to call her insurance for MFM facilities that is covered and call back and will fax MFM order to her preferred location.      Patient verbalized understanding, agreed to and intend to comply with plan of care.

## 2025-02-03 NOTE — TELEPHONE ENCOUNTER
Notified by MFM that patient was switched form Medicaid to a Medicaid plan that is not accepted by [facility] so will need L2 US cancelled for tomorrow.     Will need L2 US done with another MFM

## 2025-02-03 NOTE — TELEPHONE ENCOUNTER
Patient is 27 weeks pregnant, she was sent to [facility] for an Ultrasound. [facility] called her today and said they do not except her insurance. Where can she go?

## 2025-02-03 NOTE — TELEPHONE ENCOUNTER
Option to see if [facility] takes her insurance or call her insurance for list of MFM facilities that is covered. Patient will call back and will fax MFM order to her preferred location.     Patient verbalized understanding, agreed to and intend to comply with plan of care.

## 2025-02-04 ENCOUNTER — ROUTINE PRENATAL (OUTPATIENT)
Facility: CLINIC | Age: 30
End: 2025-02-04
Payer: MEDICAID

## 2025-02-04 ENCOUNTER — TELEPHONE (OUTPATIENT)
Facility: CLINIC | Age: 30
End: 2025-02-04

## 2025-02-04 VITALS
WEIGHT: 155 LBS | DIASTOLIC BLOOD PRESSURE: 60 MMHG | HEART RATE: 101 BPM | HEIGHT: 62 IN | SYSTOLIC BLOOD PRESSURE: 114 MMHG | BODY MASS INDEX: 28.52 KG/M2

## 2025-02-04 DIAGNOSIS — O44.02: Primary | ICD-10-CM

## 2025-02-04 DIAGNOSIS — O09.292 HISTORY OF POSTPARTUM HEMORRHAGE, CURRENTLY PREGNANT IN SECOND TRIMESTER (HCC): ICD-10-CM

## 2025-02-04 DIAGNOSIS — D56.3 ALPHA THALASSEMIA TRAIT: ICD-10-CM

## 2025-02-04 DIAGNOSIS — Z34.83 PRENATAL CARE, SUBSEQUENT PREGNANCY IN THIRD TRIMESTER (HCC): Primary | ICD-10-CM

## 2025-02-04 DIAGNOSIS — O09.292 HISTORY OF GESTATIONAL DIABETES IN PRIOR PREGNANCY, CURRENTLY PREGNANT IN SECOND TRIMESTER (HCC): ICD-10-CM

## 2025-02-04 DIAGNOSIS — Z3A.28 28 WEEKS GESTATION OF PREGNANCY (HCC): ICD-10-CM

## 2025-02-04 DIAGNOSIS — Z92.89 HISTORY OF TRANSFUSION OF PACKED RED BLOOD CELLS: ICD-10-CM

## 2025-02-04 DIAGNOSIS — O09.892 SHORT INTERVAL BETWEEN PREGNANCIES AFFECTING PREGNANCY IN SECOND TRIMESTER, ANTEPARTUM (HCC): ICD-10-CM

## 2025-02-04 DIAGNOSIS — Z86.19 HISTORY OF SYPHILIS: ICD-10-CM

## 2025-02-04 DIAGNOSIS — O34.219 HISTORY OF CESAREAN DELIVERY AFFECTING PREGNANCY (HCC): ICD-10-CM

## 2025-02-04 DIAGNOSIS — Z86.32 HISTORY OF GESTATIONAL DIABETES IN PRIOR PREGNANCY, CURRENTLY PREGNANT IN SECOND TRIMESTER (HCC): ICD-10-CM

## 2025-02-04 DIAGNOSIS — Z87.898 HISTORY OF POOR FETAL GROWTH: ICD-10-CM

## 2025-02-04 DIAGNOSIS — E61.1 IRON DEFICIENCY: ICD-10-CM

## 2025-02-04 PROCEDURE — 99214 OFFICE O/P EST MOD 30 MIN: CPT | Performed by: OBSTETRICS & GYNECOLOGY
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
## 2025-02-04 NOTE — TELEPHONE ENCOUNTER
Spoke with patient. She has found an [facility] that accepts her insurance:    [facility] [place]  [address], [address]  [place] [address]    Main Number: [phone]  Main Fax: [phone]    Routed pended external referral to Dr. Doyle to review and sign.     Will fax order and inform patient once signed.

## 2025-02-05 NOTE — TELEPHONE ENCOUNTER
MFM ordered & prenatal episode faxed to [facility] [place] [phone]     RN returned patient call, VM not set up. Left mychart msg.

## 2025-02-07 NOTE — TELEPHONE ENCOUNTER
[facility] order & prenatals refaxed to [facility] at [phone]    Fax confirmation received.    Spoke with [facility]. They will get a message to mariusz Gonzalez, to confirm they received order & prenatals. She will reach out to us if they did not receive fax. Otherwise they will contact the patient.

## 2025-02-07 NOTE — TELEPHONE ENCOUNTER
Spoke with patient. Aware order & prenatals have been refaxed. I let her know I called their office to confirm they received. They will call our office if they did not receive and if they did receive them, they will call her to schedule. Verbalized understanding.   no

## 2025-02-07 NOTE — TELEPHONE ENCOUNTER
Pt returning nurses call, says [facility] office told her they haven't received records/order

## 2025-02-21 ENCOUNTER — ROUTINE PRENATAL (OUTPATIENT)
Facility: CLINIC | Age: 30
End: 2025-02-21
Payer: MEDICAID

## 2025-02-21 VITALS
DIASTOLIC BLOOD PRESSURE: 60 MMHG | HEIGHT: 66 IN | WEIGHT: 154.63 LBS | SYSTOLIC BLOOD PRESSURE: 110 MMHG | HEART RATE: 86 BPM | BODY MASS INDEX: 24.85 KG/M2

## 2025-02-21 DIAGNOSIS — Z34.83 PRENATAL CARE, SUBSEQUENT PREGNANCY IN THIRD TRIMESTER (HCC): Primary | ICD-10-CM

## 2025-02-21 DIAGNOSIS — Z3A.30 30 WEEKS GESTATION OF PREGNANCY (HCC): ICD-10-CM

## 2025-02-21 PROCEDURE — 99214 OFFICE O/P EST MOD 30 MIN: CPT | Performed by: OBSTETRICS & GYNECOLOGY
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
## 2025-03-05 ENCOUNTER — LAB ENCOUNTER (OUTPATIENT)
Dept: LAB | Facility: HOSPITAL | Age: 30
End: 2025-03-05
Attending: OBSTETRICS & GYNECOLOGY
Payer: MEDICAID

## 2025-03-05 ENCOUNTER — ROUTINE PRENATAL (OUTPATIENT)
Facility: CLINIC | Age: 30
End: 2025-03-05
Payer: MEDICAID

## 2025-03-05 VITALS
DIASTOLIC BLOOD PRESSURE: 68 MMHG | HEIGHT: 62 IN | HEART RATE: 95 BPM | SYSTOLIC BLOOD PRESSURE: 108 MMHG | BODY MASS INDEX: 28.71 KG/M2 | WEIGHT: 156 LBS

## 2025-03-05 DIAGNOSIS — Z86.19 HISTORY OF SYPHILIS: ICD-10-CM

## 2025-03-05 DIAGNOSIS — E61.1 IRON DEFICIENCY: ICD-10-CM

## 2025-03-05 DIAGNOSIS — D56.3 ALPHA THALASSEMIA TRAIT: ICD-10-CM

## 2025-03-05 DIAGNOSIS — O36.5930 POOR FETAL GROWTH AFFECTING MANAGEMENT OF MOTHER IN THIRD TRIMESTER, SINGLE OR UNSPECIFIED FETUS (HCC): Primary | ICD-10-CM

## 2025-03-05 DIAGNOSIS — Z11.3 SCREENING EXAMINATION FOR STD (SEXUALLY TRANSMITTED DISEASE): ICD-10-CM

## 2025-03-05 DIAGNOSIS — Z14.8 GENETIC CARRIER: ICD-10-CM

## 2025-03-05 DIAGNOSIS — Z13.0 SCREENING, ANEMIA, DEFICIENCY, IRON: ICD-10-CM

## 2025-03-05 DIAGNOSIS — O99.333: ICD-10-CM

## 2025-03-05 DIAGNOSIS — O44.12: ICD-10-CM

## 2025-03-05 DIAGNOSIS — Z13.1 SCREENING FOR DIABETES MELLITUS: ICD-10-CM

## 2025-03-05 DIAGNOSIS — N89.8 VAGINAL ITCHING: ICD-10-CM

## 2025-03-05 LAB
BASOPHILS # BLD AUTO: 0.04 X10(3) UL (ref 0–0.2)
BASOPHILS NFR BLD AUTO: 0.4 %
EOSINOPHIL # BLD AUTO: 0.2 X10(3) UL (ref 0–0.7)
EOSINOPHIL NFR BLD AUTO: 2 %
ERYTHROCYTE [DISTWIDTH] IN BLOOD BY AUTOMATED COUNT: 13.6 %
HCT VFR BLD AUTO: 36.8 %
HGB BLD-MCNC: 12.2 G/DL
IMM GRANULOCYTES # BLD AUTO: 0.12 X10(3) UL (ref 0–1)
IMM GRANULOCYTES NFR BLD: 1.2 %
LYMPHOCYTES # BLD AUTO: 1.15 X10(3) UL (ref 1–4)
LYMPHOCYTES NFR BLD AUTO: 11.7 %
MCH RBC QN AUTO: 28 PG (ref 26–34)
MCHC RBC AUTO-ENTMCNC: 33.2 G/DL (ref 31–37)
MCV RBC AUTO: 84.4 FL
MONOCYTES # BLD AUTO: 0.44 X10(3) UL (ref 0.1–1)
MONOCYTES NFR BLD AUTO: 4.5 %
NEUTROPHILS # BLD AUTO: 7.88 X10 (3) UL (ref 1.5–7.7)
NEUTROPHILS # BLD AUTO: 7.88 X10(3) UL (ref 1.5–7.7)
NEUTROPHILS NFR BLD AUTO: 80.2 %
PLATELET # BLD AUTO: 232 10(3)UL (ref 150–450)
RBC # BLD AUTO: 4.36 X10(6)UL
WBC # BLD AUTO: 9.8 X10(3) UL (ref 4–11)

## 2025-03-05 PROCEDURE — 85025 COMPLETE CBC W/AUTO DIFF WBC: CPT

## 2025-03-05 PROCEDURE — 81514 NFCT DS BV&VAGINITIS DNA ALG: CPT | Performed by: OBSTETRICS & GYNECOLOGY

## 2025-03-05 PROCEDURE — 36415 COLL VENOUS BLD VENIPUNCTURE: CPT

## 2025-03-05 PROCEDURE — 86592 SYPHILIS TEST NON-TREP QUAL: CPT

## 2025-03-05 PROCEDURE — 99459 PELVIC EXAMINATION: CPT | Performed by: OBSTETRICS & GYNECOLOGY

## 2025-03-05 PROCEDURE — 87389 HIV-1 AG W/HIV-1&-2 AB AG IA: CPT

## 2025-03-05 PROCEDURE — 99214 OFFICE O/P EST MOD 30 MIN: CPT | Performed by: OBSTETRICS & GYNECOLOGY
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
## 2025-03-06 LAB
BV BACTERIA DNA VAG QL NAA+PROBE: NEGATIVE
C GLABRATA DNA VAG QL NAA+PROBE: NEGATIVE
C KRUSEI DNA VAG QL NAA+PROBE: NEGATIVE
CANDIDA DNA VAG QL NAA+PROBE: POSITIVE
RPR SER QL: NONREACTIVE
T VAGINALIS DNA VAG QL NAA+PROBE: NEGATIVE

## 2025-03-07 ENCOUNTER — TELEPHONE (OUTPATIENT)
Facility: CLINIC | Age: 30
End: 2025-03-07

## 2025-03-13 ENCOUNTER — TELEPHONE (OUTPATIENT)
Facility: CLINIC | Age: 30
End: 2025-03-13

## 2025-03-13 NOTE — TELEPHONE ENCOUNTER
Spoke with patient. Patient states she is unable to schedule ordered growth US with [facility]. She advised they did not receive the order. Advised patient I would call and see if I can fax them the order. Understanding verbalized.     Spoke with MFM at [facility]. They advised order was received and patient is scheduled for US on March 25 at 10:30 AM.     Spoke with patient and provided update. Understanding verbalized.

## 2025-03-13 NOTE — TELEPHONE ENCOUNTER
Patient stated she tried to schedule an appointment with [facility] and they informed her they never received the referral or any information. They told her she can't schedule an appointment until the referral is received.   Please advise

## 2025-03-18 ENCOUNTER — ROUTINE PRENATAL (OUTPATIENT)
Facility: CLINIC | Age: 30
End: 2025-03-18
Payer: MEDICAID

## 2025-03-18 VITALS
HEART RATE: 100 BPM | BODY MASS INDEX: 28.74 KG/M2 | WEIGHT: 156.19 LBS | HEIGHT: 62 IN | DIASTOLIC BLOOD PRESSURE: 66 MMHG | SYSTOLIC BLOOD PRESSURE: 102 MMHG

## 2025-03-18 DIAGNOSIS — Z3A.34 34 WEEKS GESTATION OF PREGNANCY (HCC): ICD-10-CM

## 2025-03-18 DIAGNOSIS — Z34.83 PRENATAL CARE, SUBSEQUENT PREGNANCY IN THIRD TRIMESTER (HCC): Primary | ICD-10-CM

## 2025-03-18 PROCEDURE — 99213 OFFICE O/P EST LOW 20 MIN: CPT | Performed by: OBSTETRICS & GYNECOLOGY
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
## 2025-03-31 ENCOUNTER — ROUTINE PRENATAL (OUTPATIENT)
Facility: CLINIC | Age: 30
End: 2025-03-31
Payer: MEDICAID

## 2025-03-31 VITALS
HEART RATE: 94 BPM | WEIGHT: 157.63 LBS | SYSTOLIC BLOOD PRESSURE: 112 MMHG | HEIGHT: 62 IN | DIASTOLIC BLOOD PRESSURE: 60 MMHG | BODY MASS INDEX: 29.01 KG/M2

## 2025-03-31 DIAGNOSIS — O09.93 SUPERVISION OF HIGH RISK PREGNANCY IN THIRD TRIMESTER (HCC): Primary | ICD-10-CM

## 2025-03-31 DIAGNOSIS — O26.899 RH NEGATIVE STATE IN ANTEPARTUM PERIOD (HCC): ICD-10-CM

## 2025-03-31 DIAGNOSIS — Z67.91 RH NEGATIVE STATE IN ANTEPARTUM PERIOD (HCC): ICD-10-CM

## 2025-03-31 DIAGNOSIS — Z3A.35 35 WEEKS GESTATION OF PREGNANCY (HCC): ICD-10-CM
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
## 2025-04-10 ENCOUNTER — ROUTINE PRENATAL (OUTPATIENT)
Facility: CLINIC | Age: 30
End: 2025-04-10
Payer: MEDICAID

## 2025-04-10 VITALS
BODY MASS INDEX: 29.44 KG/M2 | HEART RATE: 109 BPM | WEIGHT: 160 LBS | SYSTOLIC BLOOD PRESSURE: 118 MMHG | HEIGHT: 62 IN | DIASTOLIC BLOOD PRESSURE: 60 MMHG

## 2025-04-10 DIAGNOSIS — Z86.32 HISTORY OF GESTATIONAL DIABETES IN PRIOR PREGNANCY, CURRENTLY PREGNANT IN THIRD TRIMESTER (HCC): ICD-10-CM

## 2025-04-10 DIAGNOSIS — O09.893 SHORT INTERVAL BETWEEN PREGNANCIES AFFECTING PREGNANCY IN THIRD TRIMESTER, ANTEPARTUM (HCC): ICD-10-CM

## 2025-04-10 DIAGNOSIS — O09.293 HISTORY OF GESTATIONAL DIABETES IN PRIOR PREGNANCY, CURRENTLY PREGNANT IN THIRD TRIMESTER (HCC): ICD-10-CM

## 2025-04-10 DIAGNOSIS — O26.893 RH NEGATIVE STATUS DURING PREGNANCY IN THIRD TRIMESTER (HCC): ICD-10-CM

## 2025-04-10 DIAGNOSIS — O99.333: ICD-10-CM

## 2025-04-10 DIAGNOSIS — Z30.09 GENERAL COUNSELING AND ADVICE FOR CONTRACEPTIVE MANAGEMENT: ICD-10-CM

## 2025-04-10 DIAGNOSIS — E61.1 IRON DEFICIENCY: ICD-10-CM

## 2025-04-10 DIAGNOSIS — Z3A.37 37 WEEKS GESTATION OF PREGNANCY (HCC): ICD-10-CM

## 2025-04-10 DIAGNOSIS — Z14.8 GENETIC CARRIER: ICD-10-CM

## 2025-04-10 DIAGNOSIS — Z67.91 RH NEGATIVE STATUS DURING PREGNANCY IN THIRD TRIMESTER (HCC): ICD-10-CM

## 2025-04-10 DIAGNOSIS — O34.219 HISTORY OF CESAREAN DELIVERY AFFECTING PREGNANCY (HCC): Primary | ICD-10-CM

## 2025-04-10 DIAGNOSIS — O09.293 HISTORY OF POSTPARTUM HEMORRHAGE, CURRENTLY PREGNANT IN THIRD TRIMESTER (HCC): ICD-10-CM

## 2025-04-10 DIAGNOSIS — Z87.898 HISTORY OF POOR FETAL GROWTH: ICD-10-CM

## 2025-04-10 DIAGNOSIS — D56.3 ALPHA THALASSEMIA TRAIT: ICD-10-CM

## 2025-04-10 PROCEDURE — 87081 CULTURE SCREEN ONLY: CPT | Performed by: OBSTETRICS & GYNECOLOGY

## 2025-04-10 PROCEDURE — 87150 DNA/RNA AMPLIFIED PROBE: CPT | Performed by: OBSTETRICS & GYNECOLOGY

## 2025-04-10 PROCEDURE — 99214 OFFICE O/P EST MOD 30 MIN: CPT | Performed by: OBSTETRICS & GYNECOLOGY
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
## 2025-04-11 ENCOUNTER — TELEPHONE (OUTPATIENT)
Facility: CLINIC | Age: 30
End: 2025-04-11

## 2025-04-12 LAB — GROUP B STREP BY PCR FOR PCR OVT: NEGATIVE

## 2025-04-17 ENCOUNTER — ROUTINE PRENATAL (OUTPATIENT)
Dept: OBGYN CLINIC | Facility: CLINIC | Age: 30
End: 2025-04-17
Payer: MEDICAID

## 2025-04-17 VITALS
HEART RATE: 87 BPM | DIASTOLIC BLOOD PRESSURE: 68 MMHG | HEIGHT: 62 IN | BODY MASS INDEX: 29.44 KG/M2 | WEIGHT: 160 LBS | SYSTOLIC BLOOD PRESSURE: 102 MMHG

## 2025-04-17 DIAGNOSIS — Z34.80 PRENATAL CARE, SUBSEQUENT PREGNANCY, ANTEPARTUM (HCC): Primary | ICD-10-CM

## 2025-04-17 PROCEDURE — 99212 OFFICE O/P EST SF 10 MIN: CPT | Performed by: STUDENT IN AN ORGANIZED HEALTH CARE EDUCATION/TRAINING PROGRAM
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
## 2025-04-21 ENCOUNTER — TELEPHONE (OUTPATIENT)
Dept: OBGYN UNIT | Facility: HOSPITAL | Age: 30
End: 2025-04-21

## 2025-04-25 ENCOUNTER — ANESTHESIA EVENT (OUTPATIENT)
Dept: OBGYN UNIT | Facility: HOSPITAL | Age: 30
End: 2025-04-25
Payer: MEDICAID

## 2025-04-25 ENCOUNTER — ANESTHESIA (OUTPATIENT)
Dept: OBGYN UNIT | Facility: HOSPITAL | Age: 30
End: 2025-04-25
Payer: MEDICAID

## 2025-04-25 ENCOUNTER — HOSPITAL ENCOUNTER (INPATIENT)
Facility: HOSPITAL | Age: 30
LOS: 3 days | Discharge: HOME | End: 2025-04-28
Attending: STUDENT IN AN ORGANIZED HEALTH CARE EDUCATION/TRAINING PROGRAM | Admitting: STUDENT IN AN ORGANIZED HEALTH CARE EDUCATION/TRAINING PROGRAM
Payer: MEDICAID

## 2025-04-25 DIAGNOSIS — Z98.891 STATUS POST REPEAT LOW TRANSVERSE CESAREAN SECTION: Primary | ICD-10-CM

## 2025-04-25 PROBLEM — O34.219 H/O CESAREAN SECTION COMPLICATING PREGNANCY (HCC): Status: ACTIVE | Noted: 2025-04-25

## 2025-04-25 PROBLEM — Z34.90 PREGNANCY (HCC): Status: ACTIVE | Noted: 2025-04-25

## 2025-04-25 LAB
ANTIBODY SCREEN: POSITIVE
BASOPHILS # BLD AUTO: 0.02 X10(3) UL (ref 0–0.2)
BASOPHILS NFR BLD AUTO: 0.2 %
EOSINOPHIL # BLD AUTO: 0.21 X10(3) UL (ref 0–0.7)
EOSINOPHIL NFR BLD AUTO: 2.3 %
ERYTHROCYTE [DISTWIDTH] IN BLOOD BY AUTOMATED COUNT: 13.3 %
FETAL SCREEN RESULT: NEGATIVE
HCT VFR BLD AUTO: 38.4 % (ref 35–48)
HGB BLD-MCNC: 12.6 G/DL (ref 12–16)
IMM GRANULOCYTES # BLD AUTO: 0.16 X10(3) UL (ref 0–1)
IMM GRANULOCYTES NFR BLD: 1.7 %
LYMPHOCYTES # BLD AUTO: 1.36 X10(3) UL (ref 1–4)
LYMPHOCYTES NFR BLD AUTO: 14.6 %
MCH RBC QN AUTO: 28.1 PG (ref 26–34)
MCHC RBC AUTO-ENTMCNC: 32.8 G/DL (ref 31–37)
MCV RBC AUTO: 85.5 FL (ref 80–100)
MONOCYTES # BLD AUTO: 0.45 X10(3) UL (ref 0.1–1)
MONOCYTES NFR BLD AUTO: 4.8 %
NEUTROPHILS # BLD AUTO: 7.09 X10 (3) UL (ref 1.5–7.7)
NEUTROPHILS # BLD AUTO: 7.09 X10(3) UL (ref 1.5–7.7)
NEUTROPHILS NFR BLD AUTO: 76.4 %
PLATELET # BLD AUTO: 220 10(3)UL (ref 150–450)
RBC # BLD AUTO: 4.49 X10(6)UL (ref 3.8–5.3)
RH BLOOD TYPE: NEGATIVE
T PALLIDUM AB SER QL IA: REACTIVE
WBC # BLD AUTO: 9.3 X10(3) UL (ref 4–11)

## 2025-04-25 PROCEDURE — 59514 CESAREAN DELIVERY ONLY: CPT | Performed by: STUDENT IN AN ORGANIZED HEALTH CARE EDUCATION/TRAINING PROGRAM

## 2025-04-25 PROCEDURE — 3E0334Z INTRODUCTION OF SERUM, TOXOID AND VACCINE INTO PERIPHERAL VEIN, PERCUTANEOUS APPROACH: ICD-10-PCS | Performed by: STUDENT IN AN ORGANIZED HEALTH CARE EDUCATION/TRAINING PROGRAM

## 2025-04-25 RX ORDER — CITRIC ACID/SODIUM CITRATE 334-500MG
30 SOLUTION, ORAL ORAL ONCE
Status: DISCONTINUED | OUTPATIENT
Start: 2025-04-25 | End: 2025-04-25 | Stop reason: HOSPADM

## 2025-04-25 RX ORDER — CITRIC ACID/SODIUM CITRATE 334-500MG
SOLUTION, ORAL ORAL
Status: DISPENSED
Start: 2025-04-25 | End: 2025-04-25

## 2025-04-25 RX ORDER — CARBOPROST TROMETHAMINE 250 UG/ML
250 INJECTION, SOLUTION INTRAMUSCULAR ONCE AS NEEDED
Status: ACTIVE | OUTPATIENT
Start: 2025-04-25 | End: 2025-04-25

## 2025-04-25 RX ORDER — DIPHENHYDRAMINE HYDROCHLORIDE 50 MG/ML
12.5 INJECTION, SOLUTION INTRAMUSCULAR; INTRAVENOUS EVERY 4 HOURS PRN
Status: DISCONTINUED | OUTPATIENT
Start: 2025-04-25 | End: 2025-04-28

## 2025-04-25 RX ORDER — ACETAMINOPHEN 500 MG
1000 TABLET ORAL ONCE
Status: COMPLETED | OUTPATIENT
Start: 2025-04-25 | End: 2025-04-25

## 2025-04-25 RX ORDER — DIPHENHYDRAMINE HYDROCHLORIDE 50 MG/ML
25 INJECTION, SOLUTION INTRAMUSCULAR; INTRAVENOUS ONCE AS NEEDED
Status: DISCONTINUED | OUTPATIENT
Start: 2025-04-25 | End: 2025-04-25 | Stop reason: HOSPADM

## 2025-04-25 RX ORDER — KETOROLAC TROMETHAMINE 30 MG/ML
INJECTION, SOLUTION INTRAMUSCULAR; INTRAVENOUS
Status: COMPLETED
Start: 2025-04-25 | End: 2025-04-25

## 2025-04-25 RX ORDER — SCOPOLAMINE 1 MG/3D
1 PATCH, EXTENDED RELEASE TRANSDERMAL
Status: DISPENSED | OUTPATIENT
Start: 2025-04-25 | End: 2025-04-28

## 2025-04-25 RX ORDER — AMMONIA 15 % (W/V)
0.3 AMPUL (EA) INHALATION AS NEEDED
Status: DISCONTINUED | OUTPATIENT
Start: 2025-04-25 | End: 2025-04-28

## 2025-04-25 RX ORDER — HYDROMORPHONE HYDROCHLORIDE 1 MG/ML
0.2 INJECTION, SOLUTION INTRAMUSCULAR; INTRAVENOUS; SUBCUTANEOUS EVERY 5 MIN PRN
Status: DISCONTINUED | OUTPATIENT
Start: 2025-04-25 | End: 2025-04-25 | Stop reason: HOSPADM

## 2025-04-25 RX ORDER — DEXTROSE, SODIUM CHLORIDE, SODIUM LACTATE, POTASSIUM CHLORIDE, AND CALCIUM CHLORIDE 5; .6; .31; .03; .02 G/100ML; G/100ML; G/100ML; G/100ML; G/100ML
INJECTION, SOLUTION INTRAVENOUS CONTINUOUS PRN
Status: DISCONTINUED | OUTPATIENT
Start: 2025-04-25 | End: 2025-04-28

## 2025-04-25 RX ORDER — NALBUPHINE HYDROCHLORIDE 10 MG/ML
2.5 INJECTION INTRAMUSCULAR; INTRAVENOUS; SUBCUTANEOUS
Status: DISCONTINUED | OUTPATIENT
Start: 2025-04-25 | End: 2025-04-25 | Stop reason: HOSPADM

## 2025-04-25 RX ORDER — ONDANSETRON 2 MG/ML
4 INJECTION INTRAMUSCULAR; INTRAVENOUS ONCE AS NEEDED
Status: DISCONTINUED | OUTPATIENT
Start: 2025-04-25 | End: 2025-04-25 | Stop reason: HOSPADM

## 2025-04-25 RX ORDER — MISOPROSTOL 200 UG/1
TABLET ORAL
Status: DISPENSED
Start: 2025-04-25 | End: 2025-04-25

## 2025-04-25 RX ORDER — ONDANSETRON 2 MG/ML
4 INJECTION INTRAMUSCULAR; INTRAVENOUS EVERY 6 HOURS PRN
Status: DISCONTINUED | OUTPATIENT
Start: 2025-04-25 | End: 2025-04-25

## 2025-04-25 RX ORDER — HYDROMORPHONE HYDROCHLORIDE 1 MG/ML
0.3 INJECTION, SOLUTION INTRAMUSCULAR; INTRAVENOUS; SUBCUTANEOUS EVERY 2 HOUR PRN
Status: DISCONTINUED | OUTPATIENT
Start: 2025-04-25 | End: 2025-04-28

## 2025-04-25 RX ORDER — ONDANSETRON 2 MG/ML
4 INJECTION INTRAMUSCULAR; INTRAVENOUS EVERY 6 HOURS PRN
Status: DISCONTINUED | OUTPATIENT
Start: 2025-04-25 | End: 2025-04-28

## 2025-04-25 RX ORDER — DIPHENHYDRAMINE HCL 25 MG
25 CAPSULE ORAL EVERY 4 HOURS PRN
Status: DISCONTINUED | OUTPATIENT
Start: 2025-04-25 | End: 2025-04-28

## 2025-04-25 RX ORDER — ONDANSETRON 2 MG/ML
INJECTION INTRAMUSCULAR; INTRAVENOUS AS NEEDED
Status: DISCONTINUED | OUTPATIENT
Start: 2025-04-25 | End: 2025-04-25 | Stop reason: SURG

## 2025-04-25 RX ORDER — MISOPROSTOL 200 UG/1
1000 TABLET ORAL ONCE AS NEEDED
Status: ACTIVE | OUTPATIENT
Start: 2025-04-25 | End: 2025-04-25

## 2025-04-25 RX ORDER — KETOROLAC TROMETHAMINE 30 MG/ML
30 INJECTION, SOLUTION INTRAMUSCULAR; INTRAVENOUS ONCE
Status: COMPLETED | OUTPATIENT
Start: 2025-04-25 | End: 2025-04-25

## 2025-04-25 RX ORDER — METHYLERGONOVINE MALEATE 0.2 MG/ML
INJECTION INTRAVENOUS
Status: COMPLETED
Start: 2025-04-25 | End: 2025-04-25

## 2025-04-25 RX ORDER — NALOXONE HYDROCHLORIDE 0.4 MG/ML
0.08 INJECTION, SOLUTION INTRAMUSCULAR; INTRAVENOUS; SUBCUTANEOUS
Status: ACTIVE | OUTPATIENT
Start: 2025-04-25 | End: 2025-04-26

## 2025-04-25 RX ORDER — METOCLOPRAMIDE HYDROCHLORIDE 5 MG/ML
INJECTION INTRAMUSCULAR; INTRAVENOUS AS NEEDED
Status: DISCONTINUED | OUTPATIENT
Start: 2025-04-25 | End: 2025-04-25 | Stop reason: SURG

## 2025-04-25 RX ORDER — ACETAMINOPHEN 500 MG
TABLET ORAL
Status: DISPENSED
Start: 2025-04-25 | End: 2025-04-25

## 2025-04-25 RX ORDER — SODIUM CHLORIDE, SODIUM LACTATE, POTASSIUM CHLORIDE, CALCIUM CHLORIDE 600; 310; 30; 20 MG/100ML; MG/100ML; MG/100ML; MG/100ML
125 INJECTION, SOLUTION INTRAVENOUS CONTINUOUS
Status: DISCONTINUED | OUTPATIENT
Start: 2025-04-25 | End: 2025-04-25

## 2025-04-25 RX ORDER — SODIUM CHLORIDE, SODIUM LACTATE, POTASSIUM CHLORIDE, CALCIUM CHLORIDE 600; 310; 30; 20 MG/100ML; MG/100ML; MG/100ML; MG/100ML
INJECTION, SOLUTION INTRAVENOUS CONTINUOUS
Status: DISCONTINUED | OUTPATIENT
Start: 2025-04-25 | End: 2025-04-28

## 2025-04-25 RX ORDER — BUPIVACAINE HYDROCHLORIDE 7.5 MG/ML
INJECTION, SOLUTION INTRASPINAL AS NEEDED
Status: DISCONTINUED | OUTPATIENT
Start: 2025-04-25 | End: 2025-04-25 | Stop reason: SURG

## 2025-04-25 RX ORDER — ACETAMINOPHEN 500 MG
1000 TABLET ORAL EVERY 6 HOURS
Status: DISCONTINUED | OUTPATIENT
Start: 2025-04-25 | End: 2025-04-28

## 2025-04-25 RX ORDER — BISACODYL 10 MG
10 SUPPOSITORY, RECTAL RECTAL ONCE AS NEEDED
Status: DISCONTINUED | OUTPATIENT
Start: 2025-04-25 | End: 2025-04-28

## 2025-04-25 RX ORDER — TRANEXAMIC ACID 10 MG/ML
INJECTION, SOLUTION INTRAVENOUS
Status: DISPENSED
Start: 2025-04-25 | End: 2025-04-25

## 2025-04-25 RX ORDER — TRANEXAMIC ACID 10 MG/ML
INJECTION, SOLUTION INTRAVENOUS AS NEEDED
Status: DISCONTINUED | OUTPATIENT
Start: 2025-04-25 | End: 2025-04-25 | Stop reason: SURG

## 2025-04-25 RX ORDER — NALBUPHINE HYDROCHLORIDE 10 MG/ML
2.5 INJECTION INTRAMUSCULAR; INTRAVENOUS; SUBCUTANEOUS EVERY 4 HOURS PRN
Status: DISCONTINUED | OUTPATIENT
Start: 2025-04-25 | End: 2025-04-28

## 2025-04-25 RX ORDER — OXYCODONE HYDROCHLORIDE 5 MG/1
5 TABLET ORAL EVERY 4 HOURS PRN
Status: DISCONTINUED | OUTPATIENT
Start: 2025-04-25 | End: 2025-04-28

## 2025-04-25 RX ORDER — HYDROMORPHONE HYDROCHLORIDE 1 MG/ML
0.4 INJECTION, SOLUTION INTRAMUSCULAR; INTRAVENOUS; SUBCUTANEOUS EVERY 2 HOUR PRN
Refills: 0 | Status: ACTIVE | OUTPATIENT
Start: 2025-04-25 | End: 2025-04-26

## 2025-04-25 RX ORDER — DEXAMETHASONE SODIUM PHOSPHATE 4 MG/ML
VIAL (ML) INJECTION AS NEEDED
Status: DISCONTINUED | OUTPATIENT
Start: 2025-04-25 | End: 2025-04-25 | Stop reason: SURG

## 2025-04-25 RX ORDER — SIMETHICONE 80 MG
80 TABLET,CHEWABLE ORAL 3 TIMES DAILY PRN
Status: DISCONTINUED | OUTPATIENT
Start: 2025-04-25 | End: 2025-04-28

## 2025-04-25 RX ORDER — GABAPENTIN 300 MG/1
300 CAPSULE ORAL EVERY 8 HOURS PRN
Status: DISCONTINUED | OUTPATIENT
Start: 2025-04-25 | End: 2025-04-28

## 2025-04-25 RX ORDER — CARBOPROST TROMETHAMINE 250 UG/ML
INJECTION, SOLUTION INTRAMUSCULAR
Status: DISPENSED
Start: 2025-04-25 | End: 2025-04-25

## 2025-04-25 RX ORDER — METOCLOPRAMIDE HYDROCHLORIDE 5 MG/ML
10 INJECTION INTRAMUSCULAR; INTRAVENOUS EVERY 6 HOURS PRN
Status: DISCONTINUED | OUTPATIENT
Start: 2025-04-25 | End: 2025-04-28

## 2025-04-25 RX ORDER — DOCUSATE SODIUM 100 MG/1
100 CAPSULE, LIQUID FILLED ORAL
Status: DISCONTINUED | OUTPATIENT
Start: 2025-04-25 | End: 2025-04-28

## 2025-04-25 RX ORDER — HYDROMORPHONE HYDROCHLORIDE 1 MG/ML
0.4 INJECTION, SOLUTION INTRAMUSCULAR; INTRAVENOUS; SUBCUTANEOUS EVERY 5 MIN PRN
Status: DISCONTINUED | OUTPATIENT
Start: 2025-04-25 | End: 2025-04-25 | Stop reason: HOSPADM

## 2025-04-25 RX ORDER — KETOROLAC TROMETHAMINE 30 MG/ML
30 INJECTION, SOLUTION INTRAMUSCULAR; INTRAVENOUS EVERY 6 HOURS
Status: DISPENSED | OUTPATIENT
Start: 2025-04-25 | End: 2025-04-26

## 2025-04-25 RX ORDER — IBUPROFEN 600 MG/1
600 TABLET, FILM COATED ORAL EVERY 6 HOURS
Status: DISCONTINUED | OUTPATIENT
Start: 2025-04-26 | End: 2025-04-28

## 2025-04-25 RX ORDER — MORPHINE SULFATE 2 MG/ML
INJECTION, SOLUTION INTRAMUSCULAR; INTRAVENOUS AS NEEDED
Status: DISCONTINUED | OUTPATIENT
Start: 2025-04-25 | End: 2025-04-25 | Stop reason: SURG

## 2025-04-25 RX ORDER — PHENYLEPHRINE HCL 10 MG/ML
VIAL (ML) INJECTION AS NEEDED
Status: DISCONTINUED | OUTPATIENT
Start: 2025-04-25 | End: 2025-04-25 | Stop reason: SURG

## 2025-04-25 RX ADMIN — PHENYLEPHRINE HCL 100 MCG: 10 MG/ML VIAL (ML) INJECTION at 10:04:00

## 2025-04-25 RX ADMIN — ONDANSETRON 4 MG: 2 INJECTION INTRAMUSCULAR; INTRAVENOUS at 09:20:00

## 2025-04-25 RX ADMIN — MORPHINE SULFATE 0.2 MG: 2 INJECTION, SOLUTION INTRAMUSCULAR; INTRAVENOUS at 09:27:00

## 2025-04-25 RX ADMIN — METOCLOPRAMIDE HYDROCHLORIDE 10 MG: 5 INJECTION INTRAMUSCULAR; INTRAVENOUS at 09:34:00

## 2025-04-25 RX ADMIN — ONDANSETRON 4 MG: 2 INJECTION INTRAMUSCULAR; INTRAVENOUS at 09:37:00

## 2025-04-25 RX ADMIN — SODIUM CHLORIDE, SODIUM LACTATE, POTASSIUM CHLORIDE, CALCIUM CHLORIDE: 600; 310; 30; 20 INJECTION, SOLUTION INTRAVENOUS at 10:06:00

## 2025-04-25 RX ADMIN — DEXAMETHASONE SODIUM PHOSPHATE 4 MG: 4 MG/ML VIAL (ML) INJECTION at 09:34:00

## 2025-04-25 RX ADMIN — MORPHINE SULFATE 1.8 MG: 2 INJECTION, SOLUTION INTRAMUSCULAR; INTRAVENOUS at 10:12:00

## 2025-04-25 RX ADMIN — TRANEXAMIC ACID 1000 MG: 10 INJECTION, SOLUTION INTRAVENOUS at 09:45:00

## 2025-04-25 RX ADMIN — SODIUM CHLORIDE, SODIUM LACTATE, POTASSIUM CHLORIDE, CALCIUM CHLORIDE: 600; 310; 30; 20 INJECTION, SOLUTION INTRAVENOUS at 09:20:00

## 2025-04-25 RX ADMIN — BUPIVACAINE HYDROCHLORIDE 1.8 ML: 7.5 INJECTION, SOLUTION INTRASPINAL at 09:27:00

## 2025-04-25 RX ADMIN — PHENYLEPHRINE HCL 50 MCG: 10 MG/ML VIAL (ML) INJECTION at 09:34:00

## 2025-04-25 NOTE — OPERATIVE REPORT
[facility]  Operative Note    Marielena Leroy Patient Status:  Inpatient    1995 MRN [id number]   Location [facility] LABOR & DELIVERY Attending Linda Troncoso MD   Hosp Day # 0 PCP None Pcp     Date of Procedure: 25     Preoperative Diagnosis:   1) Intrauterine pregnancy at 39w3d   2) History of prior C section, short interval pregnancy  3) History of PPH due to uterine atony (QBL 2400 mL and required transfusion during that delivery)    Postoperative Diagnosis: Same - Delivered    Procedure: Repeat Low Transverse  Section    Surgeon: Linda Troncoso MD       Assistant:  Lacy Armstrong    Findings: Normal uterus, bilateral fallopian tubes, and bilateral ovaries. Clear amniotic fluid. Live female infant in vertex presentation with weight:  3120g, APGARs 9/9.     Anesthesia: spinal      QBL:  445 mL    Procedure:   The patient was positioned supine on the operating table with a left lateral tilt. Sequential compression devices were applied. A Montez catheter had been placed to drain the bladder. IV antibiotics were administered. The patient was prepped and draped in the usual sterile fashion. A time out was performed. After adequate level of anesthesia was confirmed, a Pfannenstiel incision was made in the skin and extended into the subcutaneous tissue. The fascia was scored and this incision extended laterally bilaterally with Carrillo scissors. The fascia was grasped with Kocher clamps and the underlying rectus muscles were dissected off superiorly and inferiorly.  The peritoneal cavity was tented up and entered sharply, then the opening was extended with blunt dissection. An Jose F O-ring was placed for retraction.    The vesicouterine peritoneum was incised with Brayan scissors then a bladder flap was developed. A transverse incision was made in the lower uterine segment. The fetus was delivered from the vertex presentation. The cord was clamped and cut after 30 seconds then the infant was  placed in the radiant warmer with Neonatology present. Cord blood was obtained. Pitocin and tranexamic acid were given after cord clamping to prevent PPH. The placenta was delivered intact with a three vessel cord. The uterine cavity was swept clean using a wet lap. The hysterotomy was closed in 1 layer using #1 Vicryl. Methergine was also given IM x1 to prevent uterine atony.    The hysterotomy, peritoneum, and rectus muscles were inspected and found to be hemostatic. The peritoneum was closed with 2-0 vicryl. The fascia was closed with 0-Vicryl. The subcutaneous tissue was irrigated and any bleeding dessicated with the Bovie. The subcutaneous tissue was closed using 2-0 plain gut. The skin was closed with 3-0 Monocryl. The sponge and instrument counts were reported correct. RF mat performed and complete (negative). The patient tolerated the procedure and was stable to the recovery room.     Specimen: cord blood  Drains: urinary Montez catheter   Condition:  stable  Complications: None    Linda Troncoso MD  4/25/2025  10:11 AM

## 2025-04-25 NOTE — CM/SW NOTE
reviewed chart and noted [place]. [facility] called and asked to follow [place]. PCP not listed, but patient has 1 year old twins at home. Will follow up with patient on 04/28/25 since patient just delivered.

## 2025-04-25 NOTE — ANESTHESIA PROCEDURE NOTES
Spinal Block    Date/Time: 4/25/2025 9:22 AM    Performed by: Shadia Banks MD  Authorized by: Shadia Banks MD      General Information and Staff    Start Time:  4/25/2025 9:22 AM  End Time:  4/25/2025 9:27 AM  Anesthesiologist:  Shadia Banks MD  Performed by:  Anesthesiologist  Site identification: surface landmarks    Reason for Block: at surgeon's request and surgical anesthesia    Preanesthetic Checklist: patient identified, IV checked, risks and benefits discussed, monitors and equipment checked, pre-op evaluation, timeout performed, anesthesia consent and sterile technique used      Procedure Details    Patient Position:  Sitting  Prep: ChloraPrep    Monitoring:  Cardiac monitor, heart rate and continuous pulse ox  Approach:  Midline  Location:  L4-5  Injection Technique:  Single-shot    Needle    Needle Type:  Sprotte  Needle Gauge:  24 G  Needle Length:  3.5 in    Assessment    Sensory Level:  T6  Events: clear CSF, CSF aspirated, well tolerated and blood negative      Additional Comments
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
## 2025-04-25 NOTE — PLAN OF CARE
Problem: BIRTH - VAGINAL/ SECTION  Goal: Fetal and maternal status remain reassuring during the birth process  Description: INTERVENTIONS:- Monitor vital signs- Monitor fetal heart rate- Monitor uterine activity- Monitor labor progression (vaginal delivery)- DVT prophylaxis (C/S delivery)- Surgical antibiotic prophylaxis (C/S delivery)  Outcome: Completed

## 2025-04-25 NOTE — PLAN OF CARE
Problem: BIRTH - VAGINAL/ SECTION  Goal: Fetal and maternal status remain reassuring during the birth process  Description: INTERVENTIONS:- Monitor vital signs- Monitor fetal heart rate- Monitor uterine activity- Monitor labor progression (vaginal delivery)- DVT prophylaxis (C/S delivery)- Surgical antibiotic prophylaxis (C/S delivery)  Outcome: Progressing     Problem: PAIN - ADULT  Goal: Verbalizes/displays adequate comfort level or patient's stated pain goal  Description: INTERVENTIONS:- Encourage pt to monitor pain and request assistance- Assess pain using appropriate pain scale- Administer analgesics based on type and severity of pain and evaluate response- Implement non-pharmacological measures as appropriate and evaluate response- Consider cultural and social influences on pain and pain management- Manage/alleviate anxiety- Utilize distraction and/or relaxation techniques- Monitor for opioid side effects- Notify MD/LIP if interventions unsuccessful or patient reports new pain- Anticipate increased pain with activity and pre-medicate as appropriate  Outcome: Progressing     Problem: ANXIETY  Goal: Will report anxiety at manageable levels  Description: INTERVENTIONS:- Administer medication as ordered- Teach and rehearse alternative coping skills- Provide emotional support with 1:1 interaction with staff  Outcome: Progressing     Problem: Patient/Family Goals  Goal: Patient/Family Long Term Goal  Description: Patient's Long Term Goal: Interventions:- - See additional Care Plan goals for specific interventions  Outcome: Progressing  Goal: Patient/Family Short Term Goal  Description: Patient's Short Term Goal: Interventions: - - See additional Care Plan goals for specific interventions  Outcome: Progressing      .

## 2025-04-25 NOTE — PROGRESS NOTES
Pt is a 29 year old female admitted to TRG3/TRG3-A.     Chief Complaint   Patient presents with    Scheduled       Pt is  39w3d intra-uterine pregnancy.  History obtained, consents signed. Oriented to room, staff, and plan of care.

## 2025-04-25 NOTE — ANESTHESIA POSTPROCEDURE EVALUATION
[facility], [place]    Marielena Leroy Patient Status:  Inpatient   Age/Gender 29 year old female MRN [id number]   Location [facility] LABOR & DELIVERY Attending Linda Troncoso MD   Hosp Day # 0 PCP None Pcp       Anesthesia Post-op Note     SECTION    Procedure Summary       Date: 25 Room / Location:  L+D OR   L+D OR    Anesthesia Start: 920 Anesthesia Stop:     Procedure:  SECTION (Abdomen) Diagnosis: (same, delivered)    Surgeons: Linda Troncoso MD Anesthesiologist: Shadia Banks MD    Anesthesia Type: spinal ASA Status: 2            Anesthesia Type: spinal    Vitals Value Taken Time   BP 94/55 25 10:20   Temp 97.5 25 10:24   Pulse 71 25 10:24   Resp 12 25 10:24   SpO2 99 % 25 10:24   Vitals shown include unfiled device data.        Patient Location: Labor and Delivery    Anesthesia Type: spinal    Airway Patency: patent    Postop Pain Control: adequate    Mental Status: preanesthetic baseline    Nausea/Vomiting: none    Cardiopulmonary/Hydration status: stable euvolemic    Complications: no apparent anesthesia related complications    Postop vital signs: stable    Dental Exam: Unchanged from Preop    Patient to be discharged from PACU when criteria met.

## 2025-04-25 NOTE — PLAN OF CARE
Problem: GASTROINTESTINAL - ADULT  Goal: Minimal or absence of nausea and vomiting  Description: INTERVENTIONS:- Maintain adequate hydration with IV or PO as ordered and tolerated- Nasogastric tube to low intermittent suction as ordered- Evaluate effectiveness of ordered antiemetic medications- Provide nonpharmacologic comfort measures as appropriate- Advance diet as tolerated, if ordered- Obtain nutritional consult as needed- Evaluate fluid balance  Outcome: Progressing  Goal: Maintains or returns to baseline bowel function  Description: INTERVENTIONS:- Assess bowel function- Maintain adequate hydration with IV or PO as ordered and tolerated- Evaluate effectiveness of GI medications- Encourage mobilization and activity- Obtain nutritional consult as needed- Establish a toileting routine/schedule- Consider collaborating with pharmacy to review patient's medication profile  Outcome: Progressing  Goal: Maintains adequate nutritional intake (undernourished)  Description: INTERVENTIONS:- Monitor percentage of each meal consumed- Identify factors contributing to decreased intake, treat as appropriate- Assist with meals as needed- Monitor I&O, WT and lab values- Obtain nutritional consult as needed- Optimize oral hygiene and moisture- Encourage food from home; allow for food preferences- Enhance eating environment  Outcome: Progressing  Goal: Achieves appropriate nutritional intake (bariatric)  Description: INTERVENTIONS:- Monitor for over-consumption- Identify factors contributing to increased intake, treat as appropriate- Monitor I&O, WT and lab values- Obtain nutritional consult as needed- Evaluate psychosocial factors contributing to over-consumption  Outcome: Progressing     Problem: GENITOURINARY - ADULT  Goal: Absence of urinary retention  Description: INTERVENTIONS:- Assess patient’s ability to void and empty bladder- Monitor intake/output and perform bladder scan as needed- Follow urinary retention  protocol/standard of care- Consider collaborating with pharmacy to review patient's medication profile- Implement strategies to promote bladder emptying  Outcome: Progressing     Problem: POSTPARTUM  Goal: Long Term Goal:Experiences normal postpartum course  Description: INTERVENTIONS:- Assess and monitor vital signs and lab values.- Assess fundus and lochia.- Provide ice/sitz baths for perineum discomfort.- Monitor healing of incision/episiotomy/laceration, and assess for signs and symptoms of infection and hematoma.- Assess bladder function and monitor for bladder distention.- Provide/instruct/assist with pericare as needed.- Provide VTE prophylaxis as needed.- Monitor bowel function.- Encourage ambulation and provide assistance as needed.- Assess and monitor emotional status and provide social service/psych resources as needed.- Utilize standard precautions and use personal protective equipment as indicated. Ensure aseptic care of all intravenous lines and invasive tubes/drains.- Obtain immunization and exposure to communicable diseases history.  Outcome: Progressing  Goal: Optimize infant feeding at the breast  Description: INTERVENTIONS:- Initiate breast feeding within first hour after birth. - Monitor effectiveness of current breast feeding efforts.- Assess support systems available to mother/family.- Identify cultural beliefs/practices regarding lactation, letdown techniques, maternal food preferences.- Assess mother's knowledge and previous experience with breast feeding.- Provide information as needed about early infant feeding cues (e.g., rooting, lip smacking, sucking fingers/hand) versus late cue of crying.- Discuss/demonstrate breast feeding aids (e.g., infant sling, nursing footstool/pillows, and breast pumps).- Encourage mother/other family members to express feelings/concerns, and actively listen.- Educate father/SO about benefits of breast feeding and how to manage common lactation challenges.-  Recommend avoidance of specific medications or substances incompatible with breast feeding.- Assess and monitor for signs of nipple pain/trauma.- Instruct and provide assistance with proper latch.- Review techniques for milk expression (breast pumping) and storage of breast milk. Provide pumping equipment/supplies, instructions and assistance, as needed.- Encourage rooming-in and breast feeding on demand.- Encourage skin-to-skin contact.- Provide LC support as needed.- Assess for and manage engorgement.- Provide breast feeding education handouts and information on community breast feeding support.   Outcome: Progressing  Goal: Establishment of adequate milk supply with medication/procedure interruptions  Description: INTERVENTIONS:- Review techniques for milk expression (breast pumping). - Provide pumping equipment/supplies, instructions, and assistance until it is safe to breastfeed infant.  Outcome: Progressing  Goal: Experiences normal breast weaning course  Description: INTERVENTIONS:- Assess for and manage engorgement.- Instruct on breast care.- Provide comfort measures.  Outcome: Progressing  Goal: Appropriate maternal -  bonding  Description: INTERVENTIONS:- Assess caregiver- interactions.- Assess caregiver's emotional status and coping mechanisms.- Encourage caregiver to participate in  daily care.- Assess support systems available to mother/family.- Provide /case management support as needed.  Outcome: Progressing

## 2025-04-25 NOTE — PROGRESS NOTES
Patient transferred to mother/baby room 2212 per cart in stable condition with baby and personal belongings.  Accompanied by  and staff.  Report given to mother/baby RN Ernestina.

## 2025-04-25 NOTE — ANESTHESIA PREPROCEDURE EVALUATION
PRE-OP EVALUATION    Patient Name: Marielena Leroy    Admit Diagnosis: Pregnancy (HCC) [Z34.90]    Pre-op Diagnosis: * No pre-op diagnosis entered *     SECTION    Anesthesia Procedure:  SECTION    Surgeons and Role:     * Linda Troncoso MD - Primary    Pre-op vitals reviewed.        There is no height or weight on file to calculate BMI.    Current medications reviewed.  Hospital Medications:  Current Medications[1]    Outpatient Medications:   Prescriptions Prior to Admission[2]    Allergies: Amoxicillin, Ampicillin, Penicillin g, Penicillins, Clindamycin, and Gentamicin      Anesthesia Evaluation    Patient summary reviewed.    Anesthetic Complications  (-) history of anesthetic complications         GI/Hepatic/Renal    Negative GI/hepatic/renal ROS.                             Cardiovascular    Negative cardiovascular ROS.                                                   Endo/Other    Negative endo/other ROS.                              Pulmonary    Negative pulmonary ROS.                       Neuro/Psych    Negative neuro/psych ROS.                          postpartum hemorrhage requiring transfusion    Pt w left sided pain after previous neuralaxial            Past Surgical History[3]  Social Hx on file[4]  History   Drug Use Unknown     Available pre-op labs reviewed.  Lab Results   Component Value Date    WBC 9.8 2025    WBC 9.7 2025    RBC 4.36 2025    RBC 4.22 2025    HGB 12.2 2025    HGB 11.6 (L) 2025    HCT 36.8 2025    HCT 36.5 2025    MCV 84.4 2025    MCV 86.5 2025    MCH 28.0 2025    MCH 27.5 2025    MCHC 33.2 2025    MCHC 31.8 (L) 2025    RDW 13.6 2025    RDW 13.0 2025    .0 2025     2025               Airway      Mallampati: II  Mouth opening: 3 FB  TM distance: 4 - 6 cm  Neck ROM: full Cardiovascular    Cardiovascular exam normal.         Dental              Pulmonary    Pulmonary exam normal.                 Other findings              ASA: 2   Plan: spinal and general  NPO status verified and           Plan/risks discussed with: patient and spouse  Use of blood product(s) discussed with: patient and spouse    Consented to blood products.          Present on Admission:  **None**             [1]    lactated ringers IV bolus 1,000 mL  1,000 mL Intravenous Once    Followed by    lactated ringers infusion  125 mL/hr Intravenous Continuous    acetaminophen (Tylenol Extra Strength) tab 1,000 mg  1,000 mg Oral Once    ondansetron (Zofran) 4 MG/2ML injection 4 mg  4 mg Intravenous Q6H PRN    sodium citrate-citric acid (Bicitra) 500-334 MG/5ML oral solution 30 mL  30 mL Oral Once    oxyTOCIN in sodium chloride 0.9% (Pitocin) 30 Units/500mL infusion premix  62.5-900 krystal-units/min Intravenous Continuous    ceFAZolin (Ancef) 2g in 10mL IV syringe premix  2 g Intravenous Once    acetaminophen (Tylenol Extra Strength) 500 MG tab        miSOPROStol (Cytotec) 200 MCG tab        sodium citrate-citric acid (Bicitra) 500-334 MG/5ML oral solution        tranexamic acid in sodium chloride 0.7% (Cyklokapron) 1000 mg/100mL infusion premix        oxyTOCIN in sodium chloride 0.9% (Pitocin) 30 Units/500mL infusion premix        carboprost tromethamine (Hemabate) 250 mcg/mL injection        ceFAZolin (Ancef) 2 g/10mL IV syringe premix        methylergonovine (Methergine) 0.2 mg/mL injection       [2]   Facility-Administered Medications Prior to Admission   Medication Dose Route Frequency Provider Last Rate Last Admin    [COMPLETED] Rho D immune globulin (RhoGAM) IM injection 300 mcg  300 mcg Intramuscular Once    300 mcg at 03/31/25 1547     Medications Prior to Admission   Medication Sig Dispense Refill Last Dose/Taking    folic acid 1 MG Oral Tab Take by mouth daily.       ferrous sulfate 325 (65 FE) MG Oral Tab EC Take 1 tablet (325 mg total) by mouth daily with breakfast.       prenatal  vitamin with DHA 27-0.8-228 MG Oral Cap Take 1 capsule by mouth daily.      [3]   Past Surgical History:  Procedure Laterality Date      2024    Scheduled PLTCS for di-di twins with twin B breech. Dr. Nguyen Webber, [facility]    Other surgical history  2024    US guided suction D&C, insertion intrauterine Alice device - for immediate postpartum hemorrhage. Dr. Nguyen Webber    Cedar teeth removed     [4]   Social History  Socioeconomic History    Marital status: Single   Tobacco Use    Smoking status: Some Days     Current packs/day: 2.00     Types: Cigarettes    Smokeless tobacco: Current   Vaping Use    Vaping status: Never Used   Substance and Sexual Activity    Alcohol use: Not Currently    Drug use: Never    Sexual activity: Not Currently     Partners: Male   Other Topics Concern    Caffeine Concern No    Exercise No    Seat Belt No    Special Diet No    Stress Concern No    Weight Concern No

## 2025-04-26 LAB
BASOPHILS # BLD AUTO: 0.02 X10(3) UL (ref 0–0.2)
BASOPHILS NFR BLD AUTO: 0.2 %
EOSINOPHIL # BLD AUTO: 0.17 X10(3) UL (ref 0–0.7)
EOSINOPHIL NFR BLD AUTO: 1.4 %
ERYTHROCYTE [DISTWIDTH] IN BLOOD BY AUTOMATED COUNT: 13.5 %
HCT VFR BLD AUTO: 29.3 % (ref 35–48)
HGB BLD-MCNC: 9.5 G/DL (ref 12–16)
IMM GRANULOCYTES # BLD AUTO: 0.1 X10(3) UL (ref 0–1)
IMM GRANULOCYTES NFR BLD: 0.8 %
LYMPHOCYTES # BLD AUTO: 1.63 X10(3) UL (ref 1–4)
LYMPHOCYTES NFR BLD AUTO: 13.8 %
MCH RBC QN AUTO: 28.2 PG (ref 26–34)
MCHC RBC AUTO-ENTMCNC: 32.4 G/DL (ref 31–37)
MCV RBC AUTO: 86.9 FL (ref 80–100)
MONOCYTES # BLD AUTO: 0.63 X10(3) UL (ref 0.1–1)
MONOCYTES NFR BLD AUTO: 5.3 %
NEUTROPHILS # BLD AUTO: 9.23 X10 (3) UL (ref 1.5–7.7)
NEUTROPHILS # BLD AUTO: 9.23 X10(3) UL (ref 1.5–7.7)
NEUTROPHILS NFR BLD AUTO: 78.5 %
PLATELET # BLD AUTO: 175 10(3)UL (ref 150–450)
RBC # BLD AUTO: 3.37 X10(6)UL (ref 3.8–5.3)
RPR SER QL: NONREACTIVE
WBC # BLD AUTO: 11.8 X10(3) UL (ref 4–11)

## 2025-04-26 NOTE — PLAN OF CARE
Problem: POSTPARTUM  Goal: Long Term Goal:Experiences normal postpartum course  Description: INTERVENTIONS:- Assess and monitor vital signs and lab values.- Assess fundus and lochia.- Provide ice/sitz baths for perineum discomfort.- Monitor healing of incision/episiotomy/laceration, and assess for signs and symptoms of infection and hematoma.- Assess bladder function and monitor for bladder distention.- Provide/instruct/assist with pericare as needed.- Provide VTE prophylaxis as needed.- Monitor bowel function.- Encourage ambulation and provide assistance as needed.- Assess and monitor emotional status and provide social service/psych resources as needed.- Utilize standard precautions and use personal protective equipment as indicated. Ensure aseptic care of all intravenous lines and invasive tubes/drains.- Obtain immunization and exposure to communicable diseases history.  Outcome: Progressing  Goal: Optimize infant feeding at the breast  Description: INTERVENTIONS:- Initiate breast feeding within first hour after birth. - Monitor effectiveness of current breast feeding efforts.- Assess support systems available to mother/family.- Identify cultural beliefs/practices regarding lactation, letdown techniques, maternal food preferences.- Assess mother's knowledge and previous experience with breast feeding.- Provide information as needed about early infant feeding cues (e.g., rooting, lip smacking, sucking fingers/hand) versus late cue of crying.- Discuss/demonstrate breast feeding aids (e.g., infant sling, nursing footstool/pillows, and breast pumps).- Encourage mother/other family members to express feelings/concerns, and actively listen.- Educate father/SO about benefits of breast feeding and how to manage common lactation challenges.- Recommend avoidance of specific medications or substances incompatible with breast feeding.- Assess and monitor for signs of nipple pain/trauma.- Instruct and provide assistance  with proper latch.- Review techniques for milk expression (breast pumping) and storage of breast milk. Provide pumping equipment/supplies, instructions and assistance, as needed.- Encourage rooming-in and breast feeding on demand.- Encourage skin-to-skin contact.- Provide LC support as needed.- Assess for and manage engorgement.- Provide breast feeding education handouts and information on community breast feeding support.   Outcome: Progressing  Goal: Establishment of adequate milk supply with medication/procedure interruptions  Description: INTERVENTIONS:- Review techniques for milk expression (breast pumping). - Provide pumping equipment/supplies, instructions, and assistance until it is safe to breastfeed infant.  Outcome: Progressing  Goal: Experiences normal breast weaning course  Description: INTERVENTIONS:- Assess for and manage engorgement.- Instruct on breast care.- Provide comfort measures.  Outcome: Progressing  Goal: Appropriate maternal -  bonding  Description: INTERVENTIONS:- Assess caregiver- interactions.- Assess caregiver's emotional status and coping mechanisms.- Encourage caregiver to participate in  daily care.- Assess support systems available to mother/family.- Provide /case management support as needed.  Outcome: Progressing     Problem: GASTROINTESTINAL - ADULT  Goal: Minimal or absence of nausea and vomiting  Description: INTERVENTIONS:- Maintain adequate hydration with IV or PO as ordered and tolerated- Nasogastric tube to low intermittent suction as ordered- Evaluate effectiveness of ordered antiemetic medications- Provide nonpharmacologic comfort measures as appropriate- Advance diet as tolerated, if ordered- Obtain nutritional consult as needed- Evaluate fluid balance  Outcome: Completed  Goal: Maintains or returns to baseline bowel function  Description: INTERVENTIONS:- Assess bowel function- Maintain adequate hydration with IV or PO as ordered and  tolerated- Evaluate effectiveness of GI medications- Encourage mobilization and activity- Obtain nutritional consult as needed- Establish a toileting routine/schedule- Consider collaborating with pharmacy to review patient's medication profile  Outcome: Completed  Goal: Maintains adequate nutritional intake (undernourished)  Description: INTERVENTIONS:- Monitor percentage of each meal consumed- Identify factors contributing to decreased intake, treat as appropriate- Assist with meals as needed- Monitor I&O, WT and lab values- Obtain nutritional consult as needed- Optimize oral hygiene and moisture- Encourage food from home; allow for food preferences- Enhance eating environment  Outcome: Completed  Goal: Achieves appropriate nutritional intake (bariatric)  Description: INTERVENTIONS:- Monitor for over-consumption- Identify factors contributing to increased intake, treat as appropriate- Monitor I&O, WT and lab values- Obtain nutritional consult as needed- Evaluate psychosocial factors contributing to over-consumption  Outcome: Completed     Problem: GENITOURINARY - ADULT  Goal: Absence of urinary retention  Description: INTERVENTIONS:- Assess patient’s ability to void and empty bladder- Monitor intake/output and perform bladder scan as needed- Follow urinary retention protocol/standard of care- Consider collaborating with pharmacy to review patient's medication profile- Implement strategies to promote bladder emptying  Outcome: Completed

## 2025-04-26 NOTE — PROGRESS NOTES
[facility]    Marielena Leroy Patient Status:  Inpatient   Age/Gender 29 year old female MRN [id number]   Location [facility] Attending Linda Troncoso MD   Hosp Day # 1 PCP None Pcp      Anesthesia Pain Progress Note    Anesthesia Technique:   Spinal Anesthesia     Pain Management Technique:  In addition to available oral supplemental and IV medications  Patient received neuraxial preservative free morphine for post procedural pain control.    Post Procedure Pain Quality:    Adequate    Pain Management Side Effects:  None     BP 94/53 (BP Location: Right arm)   Pulse 71   Temp 97.8 °F (36.6 °C) (Oral)   Resp 16   Ht 1.676 m (5' 6\")   Wt 72.6 kg (160 lb)   LMP 2024 (Exact Date)   SpO2 95%   Breastfeeding Yes   BMI 25.82 kg/m²       Injection Site: Injection site is intact on inspection     Complications from Pain Management or Anesthesia:   None    All patient questions were answered.  Follow up pain management is separate from intraoperative anesthetic needs.  Pain care is transitioned to primary service, with management by oral medications.    Thank you for asking us to participate in the care of your patient.    Carlo Briones MD, 25, 7:49 AM      Carlo Briones MD, 25, 7:49 AM

## 2025-04-26 NOTE — PROGRESS NOTES
Post Partum Progress Note    Post Partum Day 1    29 year old  s/p   repeat low transverse  at 39w3d     Support at bedside:   Baby girl at bedside    Subjective:   Patient reports that her pain  is controlled with medications. Lochia normal. Tolerating PO. +flatus. +voiding. + ambulating. Is breastfeeding. No other complaints.   Denies HA, VC, CP, SOB, RUQ pain    Objective:  Vitals:    25 0006 25 0200 25 0400 25 0558   BP: 100/52 95/35 106/59 94/53   BP Location: Right arm   Right arm   Pulse: 62 74 72 71   Resp: 16   16   Temp:       TempSrc:       SpO2:       Weight:       Height:         Temp:  [97.5 °F (36.4 °C)-98.5 °F (36.9 °C)] 97.8 °F (36.6 °C)  Pulse:  [52-77] 71  Resp:  [13-25] 16  BP: ()/(35-82) 94/53  SpO2:  [91 %-100 %] 95 %       Physical Exam  Gen A&O, NAD  CV regular rate  Lungs no increased WOB  Abd soft, non-distended, fundus firm under umbilicus  Pfannenstiel incision clean/dry/intact with steristrips/surgical glue  Ext nontender    Recent Labs   Lab 25  0809 25  0702   RBC 4.49 3.37*   HGB 12.6 9.5*   HCT 38.4 29.3*   MCV 85.5 86.9   MCH 28.1 28.2   MCHC 32.8 32.4   RDW 13.3 13.5   NEPRELIM 7.09 9.23*   WBC 9.3 11.8*   .0 175.0       Assessment and Plan:     #Post partum care: meeting goals of care    #Postpartum anemia: QBL Quantitative Blood Loss (mL) 445, HgB 9.5  - IV venofer ordered    #Rh status: positive    SCDs, ambulation encouraged  Disposition: anticipate discharge PPD 2-3      Noemy Higgins MD

## 2025-04-27 RX ORDER — IBUPROFEN 600 MG/1
600 TABLET, FILM COATED ORAL EVERY 6 HOURS
Qty: 20 TABLET | Refills: 0 | Status: SHIPPED | OUTPATIENT
Start: 2025-04-27

## 2025-04-27 RX ORDER — GABAPENTIN 300 MG/1
300 CAPSULE ORAL EVERY 8 HOURS PRN
Qty: 20 CAPSULE | Refills: 0 | Status: SHIPPED | OUTPATIENT
Start: 2025-04-27

## 2025-04-27 RX ORDER — OXYCODONE HYDROCHLORIDE 5 MG/1
5 TABLET ORAL EVERY 4 HOURS PRN
Qty: 10 TABLET | Refills: 0 | Status: SHIPPED | OUTPATIENT
Start: 2025-04-27

## 2025-04-27 RX ORDER — ACETAMINOPHEN 500 MG
1000 TABLET ORAL EVERY 6 HOURS
Qty: 20 TABLET | Refills: 0 | Status: SHIPPED | OUTPATIENT
Start: 2025-04-27

## 2025-04-27 NOTE — PLAN OF CARE
Problem: POSTPARTUM  Goal: Long Term Goal:Experiences normal postpartum course  Description: INTERVENTIONS:- Assess and monitor vital signs and lab values.- Assess fundus and lochia.- Provide ice/sitz baths for perineum discomfort.- Monitor healing of incision/episiotomy/laceration, and assess for signs and symptoms of infection and hematoma.- Assess bladder function and monitor for bladder distention.- Provide/instruct/assist with pericare as needed.- Provide VTE prophylaxis as needed.- Monitor bowel function.- Encourage ambulation and provide assistance as needed.- Assess and monitor emotional status and provide social service/psych resources as needed.- Utilize standard precautions and use personal protective equipment as indicated. Ensure aseptic care of all intravenous lines and invasive tubes/drains.- Obtain immunization and exposure to communicable diseases history.  4/26/2025 2258 by Wendy Dawson RN  Outcome: Progressing  4/26/2025 2258 by Wendy Dawson, RN  Outcome: Progressing  Goal: Optimize infant feeding at the breast  Description: INTERVENTIONS:- Initiate breast feeding within first hour after birth. - Monitor effectiveness of current breast feeding efforts.- Assess support systems available to mother/family.- Identify cultural beliefs/practices regarding lactation, letdown techniques, maternal food preferences.- Assess mother's knowledge and previous experience with breast feeding.- Provide information as needed about early infant feeding cues (e.g., rooting, lip smacking, sucking fingers/hand) versus late cue of crying.- Discuss/demonstrate breast feeding aids (e.g., infant sling, nursing footstool/pillows, and breast pumps).- Encourage mother/other family members to express feelings/concerns, and actively listen.- Educate father/SO about benefits of breast feeding and how to manage common lactation challenges.- Recommend avoidance of specific medications or substances incompatible with  breast feeding.- Assess and monitor for signs of nipple pain/trauma.- Instruct and provide assistance with proper latch.- Review techniques for milk expression (breast pumping) and storage of breast milk. Provide pumping equipment/supplies, instructions and assistance, as needed.- Encourage rooming-in and breast feeding on demand.- Encourage skin-to-skin contact.- Provide LC support as needed.- Assess for and manage engorgement.- Provide breast feeding education handouts and information on community breast feeding support.   2025 by Wendy Dawson RN  Outcome: Progressing  2025 by Wendy Dawson RN  Outcome: Progressing  Goal: Establishment of adequate milk supply with medication/procedure interruptions  Description: INTERVENTIONS:- Review techniques for milk expression (breast pumping). - Provide pumping equipment/supplies, instructions, and assistance until it is safe to breastfeed infant.  2025 by Wendy Dawson RN  Outcome: Progressing  2025 by Wendy Dawson RN  Outcome: Progressing  Goal: Appropriate maternal -  bonding  Description: INTERVENTIONS:- Assess caregiver- interactions.- Assess caregiver's emotional status and coping mechanisms.- Encourage caregiver to participate in  daily care.- Assess support systems available to mother/family.- Provide /case management support as needed.  2025 by Wendy Dawson RN  Outcome: Progressing  2025 by Wendy Dawson RN  Outcome: Progressing

## 2025-04-27 NOTE — PROGRESS NOTES
Post Partum Progress Note    Post Partum Day 2    29 year old  s/p   repeat low transverse  at 39w3d     Support at bedside:   Baby girl at bedside    Subjective:   Patient reports that her pain  is controlled with medications. Lochia normal. Tolerating PO. +flatus. +voiding. + ambulating. Is breastfeeding. No other complaints.   Denies HA, VC, CP, SOB, RUQ pain    Objective:  Vitals:    25 0400 25 0558 25 0820 25 1932   BP: 106/59 94/53 91/48 100/61   BP Location:  Right arm Left arm Left arm   Pulse: 72 71 67 86   Resp:  16 14 18   Temp:   98.3 °F (36.8 °C) 98.4 °F (36.9 °C)   TempSrc:   Oral Oral   SpO2:       Weight:       Height:         Temp:  [98.3 °F (36.8 °C)-98.4 °F (36.9 °C)] 98.4 °F (36.9 °C)  Pulse:  [62-86] 86  Resp:  [14-18] 18  BP: ()/(35-61) 100/61       Physical Exam  Gen A&O, NAD  CV regular rate  Lungs no increased WOB  Abd soft, non-distended, fundus firm under umbilicus  Pfannenstiel incision clean/dry/intact with steristrips/surgical glue  Ext nontender    Recent Labs   Lab 25  0809 25  0702   RBC 4.49 3.37*   HGB 12.6 9.5*   HCT 38.4 29.3*   MCV 85.5 86.9   MCH 28.1 28.2   MCHC 32.8 32.4   RDW 13.3 13.5   NEPRELIM 7.09 9.23*   WBC 9.3 11.8*   .0 175.0       Assessment and Plan:     #Post partum care: meeting goals of care    #Postpartum anemia: QBL Quantitative Blood Loss (mL) 445, HgB 9.5  - IV venofer ordered    #Rh status: positive    SCDs, ambulation encouraged  Disposition: anticipate discharge PPD 3      Noemy Higgins MD

## 2025-04-27 NOTE — DISCHARGE INSTRUCTIONS
Discharge Instructions for  Section ()  You had a  section, also called a . During the , your baby was delivered through an incision in your stomach and uterus. Full recovery after a  can take time. It’s important to take care of yourself -- for your own sake and because your new baby needs you. Here are some guidelines to follow at home.  Incision care  Here's how to take care of your incision:  Shower as needed. Pat your incision dry.  Watch your incision for signs of infection, such as more redness or drainage.  Hold a pillow against the incision when you laugh or cough and when you get up from a lying or sitting position.  Remember, it can take as long as  6 weeks for your incision to heal.  Activity  Here are some suggestions:  Don’t try to take care of anyone other than your baby and yourself.  Remember, the more active you are, the more likely you are to have an increase in your bleeding.  Get lots of rest. Take naps in the afternoon.  Increase your activities bit by bit.  Plan your activities so that you don’t have to go up or down stairs more than needed.  Do postsurgical deep breathing and coughing exercises. Ask your healthcare provider for instructions.  Initially, don’t lift anything heavier than your baby until your healthcare provider tells you it’s OK.  No greater than 10 lbs (milk weighs about 8 lbs)  Don’t drive until your healthcare provider says it’s OK.  Don’t have sex until after you’ve had a checkup with your healthcare provider (at least 4-6 weeks) and you have decided on a birth control method.  Let others do things for you. Don't hesitate to ask for help.  Avoid straining due to constipation through adequate hydration and a diet that incorporates whole foods that are plant-based.  If this is not enough to keep your stools a toothpaste like consistency, please add over-the-counter fiber supplementation like Metamucil or a daily osmotic  laxative like Miralax.  You can take one capful of Miralax with water or juice each morning and, as needed, in the evening.  While having a bowel movement, you can use can also use a stool under your feet or a squatty potty to help prevent straining.  Follow-up  Make a 6 week follow-up appointment as directed by our staff.  When to call your healthcare provider  Call your healthcare provider right away if you have any of these:  Fever of  100.4° F ( 38°C) or higher  Redness, pain, or drainage at your incision site  Bleeding that requires a new sanitary pad every hour. Heavy vaginal bleeding may be a sign of postpartum hemorrhage. It needs medical care right away.  Severe belly pain  Pain or urgency with urination  Foul odor from vaginal discharge  Trouble urinating or emptying your bladder  No bowel movement within 1 week after the birth of your baby  Swollen, red, painful area in the leg  Appearance of rash or hives  Sore, red, painful area on the breasts that may come with flu-like symptoms  Feelings of anxiety, panic, or depression      While you were here we were administering these following medications to you:     Ibuprofen 600 mg every 6 hours on the following schedule: 3 am-9 am-3 pm-9 pm    Tylenol 1000 mg every 6 hours on the following schedule: 6 am-12 pm-6 pm-12 am     Gabapentin 300 mg every 8 hours as needed for break through pain    Oxycodone 5 mg every 6 hours as needed for severe pain    Colace 100 mg twice daily at 6 am and 6 pm

## 2025-04-28 VITALS
RESPIRATION RATE: 18 BRPM | HEART RATE: 78 BPM | BODY MASS INDEX: 25.71 KG/M2 | OXYGEN SATURATION: 95 % | HEIGHT: 66 IN | DIASTOLIC BLOOD PRESSURE: 75 MMHG | TEMPERATURE: 98 F | WEIGHT: 160 LBS | SYSTOLIC BLOOD PRESSURE: 109 MMHG

## 2025-04-28 LAB
BLOOD TYPE BARCODE: 600
UNIT VOLUME: 350 ML

## 2025-04-28 NOTE — PROGRESS NOTES
NURSING DISCHARGE NOTE    Patient escorted off mother baby unit and discharged home in stable condition.

## 2025-04-28 NOTE — CM/SW NOTE
provided patient with list of PCP for infant. Patient has twins at home who go to Private PCP.  reviewed list in [place], [place] and stated that infant will need to be seen 4/29/25-5/1/25. Patient stated that she will make appointment for infant.

## 2025-04-28 NOTE — PROGRESS NOTES
Dr. Doyle notified of patient's c/o of pain and swelling in axillary tissue. Patient states that she has had this issue since she was 12 years old but that it just started hurting yesterday. LC has worked with patient regarding this issue over the last 2 days and has printed out educational material for patient. LC confirmed that patient's milk is coming in, which could cause discomfort in that area. Dr. Doyle will be rounding in the afternoon if patient would like to be seen then she is welcome to remain inpatient.

## 2025-04-28 NOTE — PROGRESS NOTES
NURSING DISCHARGE NOTE    Discharge instructions reviewed with patient. Patient encouraged to ask questions and discharge paperwork provided. Patient encouraged to make follow up appointment with OB/GYN for 6 weeks postpartum.

## 2025-04-30 ENCOUNTER — TELEPHONE (OUTPATIENT)
Dept: OBGYN UNIT | Facility: HOSPITAL | Age: 30
End: 2025-04-30

## 2025-04-30 LAB
TREPONEMAL ANTIBODIES, TPPA: REACTIVE
TREPONEMAL ANTIBODIES, TPPA: REACTIVE

## 2025-04-30 NOTE — DISCHARGE SUMMARY
[facility]   part of [facility]    Discharge Summary    Marielena Leroy Patient Status:  Inpatient    1995 MRN [id number]   Location [facility] 2SW-J Attending No att. providers found   Hosp Day # 3 PCP None Pcp       Date of Admission: 2025    Date of Discharge: 2025    Patient is a 29 year old  s/p   repeat low transverse  at 39w3d .  Delivery and post partum course were uncomplicated.  On post partum day 2, patient had met goals of post partum care and was discharged home with planned follow up with her OB provider in within 6 weeks

## 2025-04-30 NOTE — PROGRESS NOTES
Cradle call completed. Mom and baby care reviewed. All questions answered. Cradle call letters sent via [email or web address].

## 2025-06-10 ENCOUNTER — POSTPARTUM (OUTPATIENT)
Facility: CLINIC | Age: 30
End: 2025-06-10
Payer: MEDICAID

## 2025-06-10 VITALS
HEIGHT: 62 IN | BODY MASS INDEX: 24.91 KG/M2 | WEIGHT: 135.38 LBS | DIASTOLIC BLOOD PRESSURE: 68 MMHG | HEART RATE: 78 BPM | SYSTOLIC BLOOD PRESSURE: 122 MMHG

## 2025-06-10 DIAGNOSIS — Z12.4 CERVICAL CANCER SCREENING: ICD-10-CM

## 2025-06-10 DIAGNOSIS — R22.32 LEFT AXILLARY FULLNESS: ICD-10-CM

## 2025-06-10 PROCEDURE — 87624 HPV HI-RISK TYP POOLED RSLT: CPT | Performed by: STUDENT IN AN ORGANIZED HEALTH CARE EDUCATION/TRAINING PROGRAM

## 2025-06-10 PROCEDURE — 88175 CYTOPATH C/V AUTO FLUID REDO: CPT | Performed by: STUDENT IN AN ORGANIZED HEALTH CARE EDUCATION/TRAINING PROGRAM

## 2025-06-10 NOTE — PROGRESS NOTES
[facility]  Obstetrics and Gynecology   Postpartum Progress Note  Chief Complaint   Patient presents with    Postpartum Care      25 baby girl        Subjective:     Marielena Leroy is a 29 year old  female who is s/p RCS on 2025.  She reports doing well. Baby girl doing well and breast feeding. The patient reports vagina bleeding is minimal. The patient denies emotional concerns.     OB history complicated by:  Tobacco use   Short interval pregnancy   Rh negative    Review of Systems:  General:  denies fevers, chills, fatigue and malaise.   Respiratory:  denies SOB, dyspnea, cough or wheezing  Cardiovascular:  denies chest pain, palpitations, exercise intolerance   GI: denies abdominal pain, diarrhea, constipation  :  denies dysuria, hematuria, increased urinary frequency.  denies abnormal uterine bleeding or vaginal discharge.       Objective:     Vitals:    06/10/25 1025   BP: 122/68   Pulse: 78   Weight: 135 lb 6.4 oz (61.4 kg)   Height: 62\"         Body mass index is 24.76 kg/m².    General: AAO.NAD.   CVS exam: normal peripheral perfusion  Chest: non-labored breathing, no tachypnea   Abdominal exam: soft, nontender, nondistended  Incision: clean, dry and intact. Well healed without signs of infection.   Pelvic exam:   VULVA: normal appearing vulva with no masses, tenderness or lesions  PERINEUM: intact, well healed, no signs of infection.   VAGINA: normal appearing vagina with normal color and discharge, no lesions  CERVIX: normal appearing cervix without discharge or lesions  Ext: non-tender, no edema        Assessment:     Marielena Leryo is a 29 year old  female who presents for postpartum visit   Problem List[1]      Plan:     Postpartum exam   - s/p RCS   - doing well,  no complaints   - no abnormal findings on physical exam   - may return to normal activity     Contraception counseling   - discussion held with patient about family planning and contraception  -  pt desires copper IUD, will make appt for placement     Cervical cancer screening  - last pap smear: 2022 NILM  - next due: today, done    -Depression Scale Total: 0 (6/10/2025 10:29 AM)     All of the findings and plan were discussed with the patient.  She notes understanding and agrees with the plan of care.  All questions were answered to the best of my ability at this time.    RTC in 2 weeks for copper IUD placement     Aubree Arredondo MD  [facility] - OBGYN       Note to patient and family   The  [employer] Cures Act makes medical notes available to patients in the interest of transparency.  However, please be advised that this is a medical document.  It is intended as [id number] communication.  It is written and medical language may contain abbreviations or verbiage that are technical and unfamiliar.  It may appear blunt or direct.  Medical documents are intended to carry relevant information, facts as evident, and the clinical opinion of the practitioner.       [1]   Patient Active Problem List  Diagnosis    History of syphilis    Alpha thalassemia trait    Genetic carrier    History of  section    History of transfusion of packed red blood cells    History of  delivery affecting pregnancy ([facility])    Short interval between pregnancies affecting pregnancy in third trimester, antepartum ([facility])    History of poor fetal growth    Rh negative status during pregnancy in third trimester ([facility])    History of postpartum hemorrhage, currently pregnant in third trimester ([facility])    Iron deficiency    History of gestational diabetes in prior pregnancy, currently pregnant in third trimester ([facility])    Complete placenta previa with hemorrhage -> NO PREVIA as of 25    Echogenic focus of heart of fetus affecting antepartum care of mother, fetus 1 ([facility])    Influenza vaccination declined by patient    Tobacco use affecting pregnancy in third trimester, antepartum ([facility])    Vaginal itching    Pregnancy ([facility])     H/O  section complicating pregnancy ([facility])    Status post repeat low transverse  section

## 2025-06-11 LAB — HPV E6+E7 MRNA CVX QL NAA+PROBE: NEGATIVE

## 2025-06-16 ENCOUNTER — OFFICE VISIT (OUTPATIENT)
Facility: CLINIC | Age: 30
End: 2025-06-16
Payer: MEDICAID

## 2025-06-16 VITALS
DIASTOLIC BLOOD PRESSURE: 64 MMHG | BODY MASS INDEX: 22.72 KG/M2 | WEIGHT: 136.38 LBS | SYSTOLIC BLOOD PRESSURE: 108 MMHG | HEART RATE: 92 BPM | HEIGHT: 65 IN

## 2025-06-16 DIAGNOSIS — Z30.430 ENCOUNTER FOR INSERTION OF INTRAUTERINE CONTRACEPTIVE DEVICE (IUD): Primary | ICD-10-CM

## 2025-06-16 RX ORDER — COPPER 313.4 MG/1
1 INTRAUTERINE DEVICE INTRAUTERINE ONCE
Status: COMPLETED | OUTPATIENT
Start: 2025-06-16 | End: 2025-06-16

## 2025-06-16 RX ADMIN — COPPER 1 DEVICE: 313.4 INTRAUTERINE DEVICE INTRAUTERINE at 14:51:00

## 2025-06-16 NOTE — PROCEDURES
Procedure: Intrauterine device placement      Indication: contraception  Type of IUD: copper IUD    Pre-procedure:  UPT: negative  Risks, benefits and alternative discussed.  Risks discussed include uterine perforation, IUD expulsion.  Absolute pregnancy risk is very low, but if pregnancy were confirmed with IUD in place, 50% chance it would be an ectopic pregnancy.      Procedure steps:  Patient placed in lithotomy position.  Bimanual exam done.  Speculum was inserted in the vagina.  Vaginal walls and cervix were cleaned with betadine x3.  Anterior lip of cervix was grasped by tenaculum.  Uterus was sounded to 8 cm.  IUD was placed with the insertion device in the usual fashion at the fundus of the uterus.  Insertion device was removed and strings were cut to 2-3 cm from the cervix.  Tenaculum was removed.  Hemostasis to tenaculum site was achieved with pressure.  Speculum was removed.  Patient tolerated the procedure well.    Post Procedure Instructions:  - Experiencing intermittent cramping and abnormal uterine bleeding for up to 2 months is normal.  Thereafter, your periods should lighten or go away completely.    - Use a pad to catch any blood or discharge  - Call office if bleeding heavier than a period, chills, fever or severe abdominal pain  - Pelvic rest (nothing in the vagina - douching, vaginal sex or tampons) x1 week  - Follow up in 4-6 weeks for string check

## 2025-06-25 ENCOUNTER — TELEPHONE (OUTPATIENT)
Facility: CLINIC | Age: 30
End: 2025-06-25

## 2025-06-25 NOTE — TELEPHONE ENCOUNTER
Patient is complaining of pain and feels like her IUD \"fell\". Appointment rescheduled from 7/15 to 7/2, but patient would like to speak to someone before then.

## 2025-06-25 NOTE — TELEPHONE ENCOUNTER
30 yo experiencing abd. cramps since INTRAUTERINE DEVICE insertion 6/16/25.   Per pt she can feel INTRAUTERINE DEVICE string. Denies severe or sharp pain. Has not tried pain medication.    Try OTC pain medication, if pain persist or worsen to call office.     No earlier appointment for today or tomorrow. Follow up appointment 7/2/25, pt will add herself on wait list. Patient verbalized understanding, agreed to and intend to comply with plan of care.    Patient did not have INTRAUTERINE DEVICE check appointment post insertion.

## 2025-06-29 PROBLEM — O26.893 RH NEGATIVE STATUS DURING PREGNANCY IN THIRD TRIMESTER (HCC): Status: RESOLVED | Noted: 2025-01-07 | Resolved: 2025-06-29

## 2025-06-29 PROBLEM — O34.219 H/O CESAREAN SECTION COMPLICATING PREGNANCY (HCC): Status: RESOLVED | Noted: 2025-04-25 | Resolved: 2025-06-29

## 2025-06-29 PROBLEM — Z28.21 INFLUENZA VACCINATION DECLINED BY PATIENT: Status: RESOLVED | Noted: 2025-01-08 | Resolved: 2025-06-29

## 2025-06-29 PROBLEM — O35.BXX1 ECHOGENIC FOCUS OF HEART OF FETUS AFFECTING ANTEPARTUM CARE OF MOTHER, FETUS 1 (HCC): Status: RESOLVED | Noted: 2025-01-07 | Resolved: 2025-06-29

## 2025-06-29 PROBLEM — O34.219 HISTORY OF CESAREAN DELIVERY AFFECTING PREGNANCY (HCC): Status: RESOLVED | Noted: 2025-01-07 | Resolved: 2025-06-29

## 2025-06-29 PROBLEM — O09.293 HISTORY OF POSTPARTUM HEMORRHAGE, CURRENTLY PREGNANT IN THIRD TRIMESTER (HCC): Status: RESOLVED | Noted: 2025-01-07 | Resolved: 2025-06-29

## 2025-06-29 PROBLEM — Z86.32 HISTORY OF GESTATIONAL DIABETES IN PRIOR PREGNANCY, CURRENTLY PREGNANT IN THIRD TRIMESTER (HCC): Status: RESOLVED | Noted: 2025-01-07 | Resolved: 2025-06-29

## 2025-06-29 PROBLEM — Z67.91 RH NEGATIVE STATUS DURING PREGNANCY IN THIRD TRIMESTER (HCC): Status: RESOLVED | Noted: 2025-01-07 | Resolved: 2025-06-29

## 2025-06-29 PROBLEM — Z86.32 HISTORY OF GESTATIONAL DIABETES: Status: ACTIVE | Noted: 2025-06-29

## 2025-06-29 PROBLEM — O09.893 SHORT INTERVAL BETWEEN PREGNANCIES AFFECTING PREGNANCY IN THIRD TRIMESTER, ANTEPARTUM (HCC): Status: RESOLVED | Noted: 2025-01-07 | Resolved: 2025-06-29

## 2025-06-29 PROBLEM — Z34.90 PREGNANCY (HCC): Status: RESOLVED | Noted: 2025-04-25 | Resolved: 2025-06-29

## 2025-06-29 PROBLEM — O44.12: Status: RESOLVED | Noted: 2024-12-12 | Resolved: 2025-06-29

## 2025-06-29 PROBLEM — Z72.0 TOBACCO USE: Status: ACTIVE | Noted: 2025-06-29

## 2025-06-29 PROBLEM — O99.333: Status: RESOLVED | Noted: 2025-03-05 | Resolved: 2025-06-29

## 2025-06-29 PROBLEM — O09.293 HISTORY OF GESTATIONAL DIABETES IN PRIOR PREGNANCY, CURRENTLY PREGNANT IN THIRD TRIMESTER (HCC): Status: RESOLVED | Noted: 2025-01-07 | Resolved: 2025-06-29

## 2025-06-29 PROBLEM — N89.8 VAGINAL ITCHING: Status: RESOLVED | Noted: 2025-03-05 | Resolved: 2025-06-29

## 2025-07-02 ENCOUNTER — OFFICE VISIT (OUTPATIENT)
Facility: CLINIC | Age: 30
End: 2025-07-02
Payer: MEDICAID

## 2025-07-02 VITALS
DIASTOLIC BLOOD PRESSURE: 68 MMHG | BODY MASS INDEX: 24.48 KG/M2 | SYSTOLIC BLOOD PRESSURE: 108 MMHG | HEART RATE: 96 BPM | HEIGHT: 62 IN | WEIGHT: 133 LBS

## 2025-07-02 DIAGNOSIS — Z30.431 ENCOUNTER FOR ROUTINE CHECKING OF INTRAUTERINE CONTRACEPTIVE DEVICE (IUD): Primary | ICD-10-CM

## 2025-07-02 PROCEDURE — 99212 OFFICE O/P EST SF 10 MIN: CPT

## 2025-07-02 PROCEDURE — 99459 PELVIC EXAMINATION: CPT
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
## 2025-08-18 ENCOUNTER — TELEPHONE (OUTPATIENT)
Dept: OBGYN CLINIC | Facility: CLINIC | Age: 30
End: 2025-08-18

## (undated) DEVICE — LARGE, DISPOSABLE ALEXIS O C-SECTION PROTECTOR - RETRACTOR: Brand: ALEXIS ® O C-SECTION PROTECTOR - RETRACTOR

## (undated) DEVICE — KIT DEVICE VACUUM-INDUCED HEMORRHAGE CONTROL SYSTEM 60ML SYRINGE 0.25INX12FT

## (undated) NOTE — Clinical Note
Normal growth di/di twins GDM A1 - will begin to send her logs to [facility]

## (undated) NOTE — MR AVS SNAPSHOT
After Visit Summary   9/11/2020    Debbie Sherwood    MRN: [id number]           Visit Information     Date & Time  9/11/2020  2:00 PM Provider  Constantin Ortiz DO [facility]  [address]  Dept.  Phone  [phone]      Your Vit to securely access your online medical record. [employer] allows you to send messages to your doctor, view test results, renew prescriptions and request appointments. How Do I Sign Up? 1. In your Internet browser, go to [email or web address]. c Make half your plate fruits and vegetables Highly refined, white starches including white bread, rice and pasta   Eat plenty of protein, keep the fat content low Sugars:  sodas and sports drinks, candies and desserts   Eat plenty of low-fat dairy products [employer] using your mobile device or computer   using [address] with a [facility] Physician or [employer] online. The physician will respond and provide   a treatment plan within a few hours.  ONLINE VISIT  Primary Care Providers  Treatment for mild il

## (undated) NOTE — LETTER
Marielena Leroy, :1995    CONSENT FOR PROCEDURE/SEDATION    1. I authorize the performance upon Marielena Leroy  the following: IUD insertion     2. I authorize Dr. Aubree Arredondo MD (and whomever is designated as the doctor’s assistant), to perform the above-mentioned procedures.    3. If any unforeseen conditions arise during this procedure calling for additional  procedures, operations, or medications (including anesthesia and blood transfusion), I further request and authorize the doctor to do whatever he/she deems advisable in my interest.    4. I consent to the taking and reproduction of any photographs in the course of this procedure for professional purposes.    5. I consent to the administration of such sedation as may be considered necessary or advisable by the physician responsible for this service, with the exception of ______________________________________________________    6. I have been informed by my doctor of the nature and purpose of this procedure sedation, possible alternative methods of treatment, risk involved and possible complications.    7. If I have a Do Not Resuscitate (DNR) order in place, the physician and I (or the individual authorized to consent on my behalf) will discuss and agree as to whether the Do Not Resuscitate (DNR) order will remain in effect or will be discontinued during the performance of the procedure and the applicable recovery period. If the Do Not Resuscitate (DNR) order is discontinued and is to be reinstated following the procedure/recovery period, the physician will determine when the applicable recovery period ends for purposes of reinstating the Do Not Resuscitate (DNR) order.    Signature of Patient:_______________________________________________    Signature of person authorized to consent for patient:  _______________________________________________________________    Relationship to patient:  ____________________________________________    Witness: _________________________________________ Date:___________     Physician Signature: _______________________________ Date:___________

## (undated) NOTE — LETTER
Dear New MomJose Ramon, we missed you! The nurses of [facility] have tried to reach you by phone to ask if you have any questions regarding your health or the health and care of your new little one.    We hope you are doing well. If, for any reason, you have questions or concerns about your health or your baby’s health, please contact your provider or your pediatrician or family medicine physician regarding your baby.     At [facility], we feel that postpartum support is very important for new families. Please see the enclosed new parent support flyer that lists support programs and resources with both in-person and online options.     Additionally, our Breastfeeding Centers at [facility] and [facility] in [place], offer outpatient visits with our International Board-Certified Lactation   Consultants (IBCLCs) for any breastfeeding concerns or questions you may have.    For issues related to stress, anxiety or depression, we have a Nurturing Mom support group that meets both in-person or online.  There’s also a 24-hour Mom’s Line where you can request a phone call from a clinical therapist for assistance for postpartum depression.    We encourage you to take advantage of these programs and resources as you recover from childbirth and learn to care for your new infant.    Best wishes,    [facility]            [id number]

## (undated) NOTE — LETTER
[facility] [place]  After Care Instructions for IUD      Bleeding   You may experience irregular bleeding for the fist 3-6 months.  If your bleeding becomes heavier than a normal menstrual cycle, please contact our office.    Pain  You may experience mild menstrual cramping after the IUD insertion that will typically last 24-48hrs.  You may take Ibuprofen, Tylenol or Aleve to relieve the discomfort.  If you experience severe or persistent pain, please contact our office.       Restrictions    You should avoid sexual intercourse or tampon use for 1 day after insertion.  Please use a backup method of contraception for 4-7 days with the Mirena and Dolores.    When to contact our office  If you are experiencing discomfort described as worse than menstrual cramps that is not relieved by ibuprofen   Fever of 100.4 or greater  Vaginal bleeding that is saturating 1 pad per hour    Any discomfort or poking sensation during intercourse or other sexual activity      Follow up in 4-6 weeks for IUD check.   If you have additional questions or concerns, please call us at [phone].

## (undated) NOTE — Clinical Note
She was really wanting delivery at 39w. I encouraged her to keep her scheduled .  Testing looked reassuring.  Twin B abdomen is at the 9%, so just barely below and they are not discordant.  Should likely be ok for her desired 39 weeks.